# Patient Record
Sex: FEMALE | Race: WHITE | NOT HISPANIC OR LATINO | Employment: UNEMPLOYED | URBAN - METROPOLITAN AREA
[De-identification: names, ages, dates, MRNs, and addresses within clinical notes are randomized per-mention and may not be internally consistent; named-entity substitution may affect disease eponyms.]

---
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
## 2018-01-03 ENCOUNTER — ROUTINE PRENATAL (OUTPATIENT)
Dept: OBSTETRICS AND GYNECOLOGY | Facility: CLINIC | Age: 18
End: 2018-01-03

## 2018-01-03 ENCOUNTER — PROCEDURE VISIT (OUTPATIENT)
Dept: OBSTETRICS AND GYNECOLOGY | Facility: CLINIC | Age: 18
End: 2018-01-03

## 2018-01-03 VITALS — BODY MASS INDEX: 33.56 KG/M2 | DIASTOLIC BLOOD PRESSURE: 58 MMHG | SYSTOLIC BLOOD PRESSURE: 121 MMHG | WEIGHT: 177.6 LBS

## 2018-01-03 DIAGNOSIS — K83.1 CHOLESTASIS DURING PREGNANCY IN THIRD TRIMESTER: Primary | ICD-10-CM

## 2018-01-03 DIAGNOSIS — O26.613 CHOLESTASIS DURING PREGNANCY IN THIRD TRIMESTER: Primary | ICD-10-CM

## 2018-01-03 DIAGNOSIS — Z3A.33 33 WEEKS GESTATION OF PREGNANCY: ICD-10-CM

## 2018-01-03 PROCEDURE — 76819 FETAL BIOPHYS PROFIL W/O NST: CPT | Performed by: OBSTETRICS & GYNECOLOGY

## 2018-01-03 PROCEDURE — 0502F SUBSEQUENT PRENATAL CARE: CPT | Performed by: OBSTETRICS & GYNECOLOGY

## 2018-01-03 PROCEDURE — 76816 OB US FOLLOW-UP PER FETUS: CPT | Performed by: OBSTETRICS & GYNECOLOGY

## 2018-01-03 NOTE — PROGRESS NOTES
CC:  Pregnancy  Patient reports occasional episodes of feeling dizzy.  Had one episode over the weekend while in a car as a passenger when she felt dizzy and then passed out.  She reports blurry vision and ringing in her ears prior to episode.  Explained to patient the symptoms seem related to vasovagal syncope.  Recommend hydration &/or compression stockings.  She has no associated chest pain or shortness of breath.  She reports good fetal movement.  Growth u/s today is reassuring and BPP 8/8.  Discussed steroid course prior to induction and GBS swab next week.  She reports pruritis is currently under control with ursodiol.  A/P:  Supervision of pregnancy at 33 weeks with intrahepatic cholestasis  --Followup weekly with weekly BPPs  Counseling was given to patient for the following topics: diagnostic results, instructions for management, prognosis and patient and family education . Total time of the encounter was 15 minutes and 10 minutes was spend counseling.

## 2018-01-12 ENCOUNTER — PROCEDURE VISIT (OUTPATIENT)
Dept: OBSTETRICS AND GYNECOLOGY | Facility: CLINIC | Age: 18
End: 2018-01-12

## 2018-01-12 ENCOUNTER — ROUTINE PRENATAL (OUTPATIENT)
Dept: OBSTETRICS AND GYNECOLOGY | Facility: CLINIC | Age: 18
End: 2018-01-12

## 2018-01-12 VITALS — SYSTOLIC BLOOD PRESSURE: 112 MMHG | WEIGHT: 179 LBS | BODY MASS INDEX: 33.82 KG/M2 | DIASTOLIC BLOOD PRESSURE: 66 MMHG

## 2018-01-12 DIAGNOSIS — K83.1 CHOLESTASIS DURING PREGNANCY IN THIRD TRIMESTER: Primary | ICD-10-CM

## 2018-01-12 DIAGNOSIS — O26.613 CHOLESTASIS DURING PREGNANCY IN THIRD TRIMESTER: Primary | ICD-10-CM

## 2018-01-12 DIAGNOSIS — Z3A.34 34 WEEKS GESTATION OF PREGNANCY: ICD-10-CM

## 2018-01-12 PROCEDURE — 0502F SUBSEQUENT PRENATAL CARE: CPT | Performed by: OBSTETRICS & GYNECOLOGY

## 2018-01-12 PROCEDURE — 76819 FETAL BIOPHYS PROFIL W/O NST: CPT | Performed by: OBSTETRICS & GYNECOLOGY

## 2018-01-12 NOTE — PROGRESS NOTES
CC:  Pregnancy  Pt feels well. No issues or concerns.  Patient reports good fetal movement.  Plan on betamethasone course next week for fetal lung maturity and IOL on 1/23 for cholestasis.  BPP 8/8.  GBS done today.  A/P:  Supervision of pregnancy at 34 weeks with cholestasis  --GBS today  --BMZ course next week on Thursday and Friday  --Ob visit and BPP next Friday  --IOL on 1/23

## 2018-01-16 LAB — B-HEM STREP SPEC QL CULT: NEGATIVE

## 2018-01-18 ENCOUNTER — CLINICAL SUPPORT (OUTPATIENT)
Dept: OBSTETRICS AND GYNECOLOGY | Facility: CLINIC | Age: 18
End: 2018-01-18

## 2018-01-18 VITALS
HEIGHT: 62 IN | HEART RATE: 90 BPM | BODY MASS INDEX: 33.6 KG/M2 | WEIGHT: 182.6 LBS | SYSTOLIC BLOOD PRESSURE: 93 MMHG | DIASTOLIC BLOOD PRESSURE: 60 MMHG

## 2018-01-18 DIAGNOSIS — O26.613 CHOLESTASIS DURING PREGNANCY IN THIRD TRIMESTER: ICD-10-CM

## 2018-01-18 DIAGNOSIS — K83.1 CHOLESTASIS DURING PREGNANCY IN THIRD TRIMESTER: ICD-10-CM

## 2018-01-18 DIAGNOSIS — Z3A.35 35 WEEKS GESTATION OF PREGNANCY: Primary | ICD-10-CM

## 2018-01-18 PROCEDURE — 96372 THER/PROPH/DIAG INJ SC/IM: CPT | Performed by: OBSTETRICS & GYNECOLOGY

## 2018-01-18 RX ORDER — BETAMETHASONE SODIUM PHOSPHATE AND BETAMETHASONE ACETATE 3; 3 MG/ML; MG/ML
12 INJECTION, SUSPENSION INTRA-ARTICULAR; INTRALESIONAL; INTRAMUSCULAR; SOFT TISSUE EVERY 24 HOURS
Status: SHIPPED | OUTPATIENT
Start: 2018-01-18 | End: 2018-01-20

## 2018-01-18 RX ADMIN — BETAMETHASONE SODIUM PHOSPHATE AND BETAMETHASONE ACETATE 12 MG: 3; 3 INJECTION, SUSPENSION INTRA-ARTICULAR; INTRALESIONAL; INTRAMUSCULAR; SOFT TISSUE at 14:22

## 2018-01-18 NOTE — PROGRESS NOTES
CC: Pt here for steroid injection     Lot # :[id number]   Exp:02/2019   NDC:0226-5884-64    Pt felt lightheaded after injection.

## 2018-01-19 ENCOUNTER — ROUTINE PRENATAL (OUTPATIENT)
Dept: OBSTETRICS AND GYNECOLOGY | Facility: CLINIC | Age: 18
End: 2018-01-19

## 2018-01-19 ENCOUNTER — PROCEDURE VISIT (OUTPATIENT)
Dept: OBSTETRICS AND GYNECOLOGY | Facility: CLINIC | Age: 18
End: 2018-01-19

## 2018-01-19 VITALS — DIASTOLIC BLOOD PRESSURE: 87 MMHG | BODY MASS INDEX: 34.09 KG/M2 | WEIGHT: 186.4 LBS | SYSTOLIC BLOOD PRESSURE: 129 MMHG

## 2018-01-19 DIAGNOSIS — Z3A.35 35 WEEKS GESTATION OF PREGNANCY: ICD-10-CM

## 2018-01-19 DIAGNOSIS — K83.1 CHOLESTASIS DURING PREGNANCY IN THIRD TRIMESTER: Primary | ICD-10-CM

## 2018-01-19 DIAGNOSIS — O26.613 CHOLESTASIS DURING PREGNANCY IN THIRD TRIMESTER: Primary | ICD-10-CM

## 2018-01-19 PROCEDURE — 76819 FETAL BIOPHYS PROFIL W/O NST: CPT | Performed by: OBSTETRICS & GYNECOLOGY

## 2018-01-19 PROCEDURE — 96372 THER/PROPH/DIAG INJ SC/IM: CPT | Performed by: OBSTETRICS & GYNECOLOGY

## 2018-01-19 PROCEDURE — 0502F SUBSEQUENT PRENATAL CARE: CPT | Performed by: OBSTETRICS & GYNECOLOGY

## 2018-01-19 RX ORDER — BETAMETHASONE SODIUM PHOSPHATE AND BETAMETHASONE ACETATE 3; 3 MG/ML; MG/ML
12 INJECTION, SUSPENSION INTRA-ARTICULAR; INTRALESIONAL; INTRAMUSCULAR; SOFT TISSUE EVERY 24 HOURS
Status: SHIPPED | OUTPATIENT
Start: 2018-01-19 | End: 2018-01-21

## 2018-01-19 RX ADMIN — BETAMETHASONE SODIUM PHOSPHATE AND BETAMETHASONE ACETATE 12 MG: 3; 3 INJECTION, SUSPENSION INTRA-ARTICULAR; INTRALESIONAL; INTRAMUSCULAR; SOFT TISSUE at 16:40

## 2018-01-19 NOTE — PROGRESS NOTES
CC:  Pregnancy  Pt c/o restless leg syndrome.  Advised hydration and potassium rich foods.  She reports good fetal movement.  Receiving 2nd BMZ injection today.  Planning IOL Monday night and discussed process of cervical ripening.  Discussed continuing to monitor fetal kick counts.  Risks and benefits of induction discussed with her and she agrees to proceed.  A/P:  Supervision of pregnancy at 35 weeks with cholestasis  --2nd BMZ injection today  --IOL on Monday night

## 2018-01-22 ENCOUNTER — HOSPITAL ENCOUNTER (INPATIENT)
Facility: HOSPITAL | Age: 18
LOS: 3 days | Discharge: HOME | End: 2018-01-25
Attending: OBSTETRICS & GYNECOLOGY | Admitting: OBSTETRICS & GYNECOLOGY

## 2018-01-22 DIAGNOSIS — O09.899 RUBELLA NON-IMMUNE STATUS, ANTEPARTUM: ICD-10-CM

## 2018-01-22 DIAGNOSIS — Z34.01 ENCOUNTER FOR SUPERVISION OF NORMAL FIRST PREGNANCY IN FIRST TRIMESTER: Primary | ICD-10-CM

## 2018-01-22 DIAGNOSIS — Z28.39 RUBELLA NON-IMMUNE STATUS, ANTEPARTUM: ICD-10-CM

## 2018-01-22 DIAGNOSIS — O26.613 CHOLESTASIS DURING PREGNANCY IN THIRD TRIMESTER: ICD-10-CM

## 2018-01-22 DIAGNOSIS — K83.1 CHOLESTASIS DURING PREGNANCY IN THIRD TRIMESTER: ICD-10-CM

## 2018-01-22 LAB
ABO GROUP BLD: NORMAL
ALBUMIN SERPL-MCNC: 3.5 G/DL (ref 3.5–5.2)
ALBUMIN/GLOB SERPL: 1.1 G/DL
ALP SERPL-CCNC: 97 U/L (ref 39–117)
ALT SERPL W P-5'-P-CCNC: 33 U/L (ref 1–33)
ANION GAP SERPL CALCULATED.3IONS-SCNC: 14.6 MMOL/L
AST SERPL-CCNC: 19 U/L (ref 1–32)
BASOPHILS # BLD AUTO: 0.03 10*3/MM3 (ref 0–0.2)
BASOPHILS NFR BLD AUTO: 0.2 % (ref 0–1.5)
BILIRUB SERPL-MCNC: 0.3 MG/DL (ref 0.1–1.2)
BLD GP AB SCN SERPL QL: POSITIVE
BUN BLD-MCNC: 11 MG/DL (ref 6–20)
BUN/CREAT SERPL: 22 (ref 7–25)
CALCIUM SPEC-SCNC: 8.6 MG/DL (ref 8.6–10.5)
CHLORIDE SERPL-SCNC: 102 MMOL/L (ref 98–107)
CO2 SERPL-SCNC: 20.4 MMOL/L (ref 22–29)
CREAT BLD-MCNC: 0.5 MG/DL (ref 0.57–1)
DEPRECATED RDW RBC AUTO: 40.6 FL (ref 37–54)
EOSINOPHIL # BLD AUTO: 0.05 10*3/MM3 (ref 0–0.7)
EOSINOPHIL NFR BLD AUTO: 0.3 % (ref 0.3–6.2)
ERYTHROCYTE [DISTWIDTH] IN BLOOD BY AUTOMATED COUNT: 13.6 % (ref 11.7–13)
GFR SERPL CREATININE-BSD FRML MDRD: >150 ML/MIN/1.73
GLOBULIN UR ELPH-MCNC: 3.1 GM/DL
GLUCOSE BLD-MCNC: 112 MG/DL (ref 65–99)
HCT VFR BLD AUTO: 28.5 % (ref 35.6–45.5)
HGB BLD-MCNC: 9 G/DL (ref 11.9–15.5)
IMM GRANULOCYTES # BLD: 0.3 10*3/MM3 (ref 0–0.03)
IMM GRANULOCYTES NFR BLD: 1.9 % (ref 0–0.5)
LYMPHOCYTES # BLD AUTO: 2.09 10*3/MM3 (ref 0.9–4.8)
LYMPHOCYTES NFR BLD AUTO: 13.1 % (ref 19.6–45.3)
MCH RBC QN AUTO: 25.8 PG (ref 26.9–32)
MCHC RBC AUTO-ENTMCNC: 31.6 G/DL (ref 32.4–36.3)
MCV RBC AUTO: 81.7 FL (ref 80.5–98.2)
MONOCYTES # BLD AUTO: 0.83 10*3/MM3 (ref 0.2–1.2)
MONOCYTES NFR BLD AUTO: 5.2 % (ref 5–12)
NEUTROPHILS # BLD AUTO: 12.67 10*3/MM3 (ref 1.9–8.1)
NEUTROPHILS NFR BLD AUTO: 79.3 % (ref 42.7–76)
PLATELET # BLD AUTO: 249 10*3/MM3 (ref 140–500)
PMV BLD AUTO: 11.5 FL (ref 6–12)
POTASSIUM BLD-SCNC: 3.5 MMOL/L (ref 3.5–5.2)
PROT SERPL-MCNC: 6.6 G/DL (ref 6–8.5)
RBC # BLD AUTO: 3.49 10*6/MM3 (ref 3.9–5.2)
RESIDUAL RHIG DETECTED: NORMAL
RH BLD: NEGATIVE
SODIUM BLD-SCNC: 137 MMOL/L (ref 136–145)
WBC NRBC COR # BLD: 15.97 10*3/MM3 (ref 4.5–10.7)

## 2018-01-22 PROCEDURE — 10907ZC DRAINAGE OF AMNIOTIC FLUID, THERAPEUTIC FROM PRODUCTS OF CONCEPTION, VIA NATURAL OR ARTIFICIAL OPENING: ICD-10-PCS | Performed by: OBSTETRICS & GYNECOLOGY

## 2018-01-22 PROCEDURE — 80053 COMPREHEN METABOLIC PANEL: CPT | Performed by: OBSTETRICS & GYNECOLOGY

## 2018-01-22 PROCEDURE — 86850 RBC ANTIBODY SCREEN: CPT | Performed by: OBSTETRICS & GYNECOLOGY

## 2018-01-22 PROCEDURE — 86870 RBC ANTIBODY IDENTIFICATION: CPT | Performed by: OBSTETRICS & GYNECOLOGY

## 2018-01-22 PROCEDURE — 86901 BLOOD TYPING SEROLOGIC RH(D): CPT | Performed by: OBSTETRICS & GYNECOLOGY

## 2018-01-22 PROCEDURE — 85025 COMPLETE CBC W/AUTO DIFF WBC: CPT | Performed by: OBSTETRICS & GYNECOLOGY

## 2018-01-22 PROCEDURE — 86900 BLOOD TYPING SEROLOGIC ABO: CPT | Performed by: OBSTETRICS & GYNECOLOGY

## 2018-01-22 PROCEDURE — 3E033VJ INTRODUCTION OF OTHER HORMONE INTO PERIPHERAL VEIN, PERCUTANEOUS APPROACH: ICD-10-PCS | Performed by: OBSTETRICS & GYNECOLOGY

## 2018-01-22 RX ORDER — ONDANSETRON 4 MG/1
4 TABLET, FILM COATED ORAL EVERY 6 HOURS PRN
Status: DISCONTINUED | OUTPATIENT
Start: 2018-01-22 | End: 2018-01-23

## 2018-01-22 RX ORDER — SODIUM CHLORIDE 0.9 % (FLUSH) 0.9 %
1-10 SYRINGE (ML) INJECTION AS NEEDED
Status: DISCONTINUED | OUTPATIENT
Start: 2018-01-22 | End: 2018-01-23

## 2018-01-22 RX ORDER — ONDANSETRON 2 MG/ML
4 INJECTION INTRAMUSCULAR; INTRAVENOUS EVERY 6 HOURS PRN
Status: DISCONTINUED | OUTPATIENT
Start: 2018-01-22 | End: 2018-01-23

## 2018-01-22 RX ORDER — ONDANSETRON 4 MG/1
4 TABLET, ORALLY DISINTEGRATING ORAL EVERY 6 HOURS PRN
Status: DISCONTINUED | OUTPATIENT
Start: 2018-01-22 | End: 2018-01-23

## 2018-01-22 RX ORDER — ACETAMINOPHEN 325 MG/1
650 TABLET ORAL EVERY 4 HOURS PRN
Status: DISCONTINUED | OUTPATIENT
Start: 2018-01-22 | End: 2018-01-23

## 2018-01-22 RX ORDER — MISOPROSTOL 100 MCG
25 TABLET ORAL
Status: DISCONTINUED | OUTPATIENT
Start: 2018-01-22 | End: 2018-01-23

## 2018-01-22 RX ORDER — SODIUM CHLORIDE, SODIUM LACTATE, POTASSIUM CHLORIDE, CALCIUM CHLORIDE 600; 310; 30; 20 MG/100ML; MG/100ML; MG/100ML; MG/100ML
125 INJECTION, SOLUTION INTRAVENOUS CONTINUOUS
Status: DISCONTINUED | OUTPATIENT
Start: 2018-01-22 | End: 2018-01-23

## 2018-01-22 RX ORDER — METHYLERGONOVINE MALEATE 0.2 MG/ML
200 INJECTION INTRAVENOUS ONCE AS NEEDED
Status: DISCONTINUED | OUTPATIENT
Start: 2018-01-22 | End: 2018-01-23

## 2018-01-22 RX ORDER — LIDOCAINE HYDROCHLORIDE 10 MG/ML
5 INJECTION, SOLUTION INFILTRATION; PERINEURAL AS NEEDED
Status: DISCONTINUED | OUTPATIENT
Start: 2018-01-22 | End: 2018-01-23

## 2018-01-22 RX ORDER — MISOPROSTOL 200 UG/1
800 TABLET ORAL AS NEEDED
Status: DISCONTINUED | OUTPATIENT
Start: 2018-01-22 | End: 2018-01-23

## 2018-01-22 RX ORDER — CARBOPROST TROMETHAMINE 250 UG/ML
250 INJECTION, SOLUTION INTRAMUSCULAR AS NEEDED
Status: DISCONTINUED | OUTPATIENT
Start: 2018-01-22 | End: 2018-01-23

## 2018-01-22 RX ADMIN — SODIUM CHLORIDE, POTASSIUM CHLORIDE, SODIUM LACTATE AND CALCIUM CHLORIDE 125 ML/HR: 600; 310; 30; 20 INJECTION, SOLUTION INTRAVENOUS at 20:34

## 2018-01-22 RX ADMIN — MISOPROSTOL 25 MCG: 100 TABLET ORAL at 21:13

## 2018-01-23 ENCOUNTER — ANESTHESIA EVENT (OUTPATIENT)
Dept: LABOR AND DELIVERY | Facility: HOSPITAL | Age: 18
End: 2018-01-23

## 2018-01-23 ENCOUNTER — ANESTHESIA (OUTPATIENT)
Dept: LABOR AND DELIVERY | Facility: HOSPITAL | Age: 18
End: 2018-01-23

## 2018-01-23 LAB
ABO GROUP BLD: NORMAL
EXPIRATION DATE: NORMAL
Lab: NORMAL
PROT UR STRIP-MCNC: NEGATIVE MG/DL
RH BLD: NEGATIVE

## 2018-01-23 PROCEDURE — 85461 HEMOGLOBIN FETAL: CPT | Performed by: OBSTETRICS & GYNECOLOGY

## 2018-01-23 PROCEDURE — 86900 BLOOD TYPING SEROLOGIC ABO: CPT | Performed by: OBSTETRICS & GYNECOLOGY

## 2018-01-23 PROCEDURE — 86901 BLOOD TYPING SEROLOGIC RH(D): CPT | Performed by: OBSTETRICS & GYNECOLOGY

## 2018-01-23 PROCEDURE — 0HQ9XZZ REPAIR PERINEUM SKIN, EXTERNAL APPROACH: ICD-10-PCS | Performed by: OBSTETRICS & GYNECOLOGY

## 2018-01-23 PROCEDURE — 25010000002 BUTORPHANOL PER 1 MG: Performed by: OBSTETRICS & GYNECOLOGY

## 2018-01-23 PROCEDURE — 81002 URINALYSIS NONAUTO W/O SCOPE: CPT | Performed by: OBSTETRICS & GYNECOLOGY

## 2018-01-23 PROCEDURE — 59409 OBSTETRICAL CARE: CPT | Performed by: OBSTETRICS & GYNECOLOGY

## 2018-01-23 PROCEDURE — C1755 CATHETER, INTRASPINAL: HCPCS | Performed by: ANESTHESIOLOGY

## 2018-01-23 PROCEDURE — 88307 TISSUE EXAM BY PATHOLOGIST: CPT

## 2018-01-23 PROCEDURE — 10H07YZ INSERTION OF OTHER DEVICE INTO PRODUCTS OF CONCEPTION, VIA NATURAL OR ARTIFICIAL OPENING: ICD-10-PCS | Performed by: OBSTETRICS & GYNECOLOGY

## 2018-01-23 RX ORDER — BUTORPHANOL TARTRATE 1 MG/ML
1 INJECTION, SOLUTION INTRAMUSCULAR; INTRAVENOUS ONCE
Status: COMPLETED | OUTPATIENT
Start: 2018-01-23 | End: 2018-01-23

## 2018-01-23 RX ORDER — PROMETHAZINE HYDROCHLORIDE 25 MG/1
12.5 SUPPOSITORY RECTAL EVERY 6 HOURS PRN
Status: DISCONTINUED | OUTPATIENT
Start: 2018-01-23 | End: 2018-01-25 | Stop reason: HOSPADM

## 2018-01-23 RX ORDER — ONDANSETRON 2 MG/ML
4 INJECTION INTRAMUSCULAR; INTRAVENOUS EVERY 6 HOURS PRN
Status: DISCONTINUED | OUTPATIENT
Start: 2018-01-23 | End: 2018-01-23

## 2018-01-23 RX ORDER — ONDANSETRON 2 MG/ML
4 INJECTION INTRAMUSCULAR; INTRAVENOUS EVERY 6 HOURS PRN
Status: DISCONTINUED | OUTPATIENT
Start: 2018-01-23 | End: 2018-01-25 | Stop reason: HOSPADM

## 2018-01-23 RX ORDER — BISACODYL 10 MG
10 SUPPOSITORY, RECTAL RECTAL DAILY PRN
Status: DISCONTINUED | OUTPATIENT
Start: 2018-01-24 | End: 2018-01-25 | Stop reason: HOSPADM

## 2018-01-23 RX ORDER — ONDANSETRON 4 MG/1
4 TABLET, ORALLY DISINTEGRATING ORAL EVERY 6 HOURS PRN
Status: DISCONTINUED | OUTPATIENT
Start: 2018-01-23 | End: 2018-01-25 | Stop reason: HOSPADM

## 2018-01-23 RX ORDER — ACETAMINOPHEN 325 MG/1
650 TABLET ORAL EVERY 4 HOURS PRN
Status: DISCONTINUED | OUTPATIENT
Start: 2018-01-23 | End: 2018-01-25 | Stop reason: HOSPADM

## 2018-01-23 RX ORDER — SODIUM CHLORIDE 9 MG/ML
100 INJECTION, SOLUTION INTRAVENOUS CONTINUOUS
Status: DISCONTINUED | OUTPATIENT
Start: 2018-01-23 | End: 2018-01-23

## 2018-01-23 RX ORDER — SODIUM CHLORIDE 0.9 % (FLUSH) 0.9 %
1-10 SYRINGE (ML) INJECTION AS NEEDED
Status: DISCONTINUED | OUTPATIENT
Start: 2018-01-23 | End: 2018-01-25 | Stop reason: HOSPADM

## 2018-01-23 RX ORDER — PROMETHAZINE HYDROCHLORIDE 25 MG/1
25 TABLET ORAL EVERY 6 HOURS PRN
Status: DISCONTINUED | OUTPATIENT
Start: 2018-01-23 | End: 2018-01-25 | Stop reason: HOSPADM

## 2018-01-23 RX ORDER — LANOLIN 100 %
OINTMENT (GRAM) TOPICAL
Status: DISCONTINUED | OUTPATIENT
Start: 2018-01-23 | End: 2018-01-25 | Stop reason: HOSPADM

## 2018-01-23 RX ORDER — ERYTHROMYCIN 5 MG/G
OINTMENT OPHTHALMIC
Status: DISPENSED
Start: 2018-01-23 | End: 2018-01-24

## 2018-01-23 RX ORDER — ZOLPIDEM TARTRATE 5 MG/1
5 TABLET ORAL NIGHTLY PRN
Status: DISCONTINUED | OUTPATIENT
Start: 2018-01-23 | End: 2018-01-25 | Stop reason: HOSPADM

## 2018-01-23 RX ORDER — ONDANSETRON 4 MG/1
4 TABLET, FILM COATED ORAL EVERY 6 HOURS PRN
Status: DISCONTINUED | OUTPATIENT
Start: 2018-01-23 | End: 2018-01-25 | Stop reason: HOSPADM

## 2018-01-23 RX ORDER — PROMETHAZINE HYDROCHLORIDE 25 MG/ML
12.5 INJECTION, SOLUTION INTRAMUSCULAR; INTRAVENOUS EVERY 6 HOURS PRN
Status: DISCONTINUED | OUTPATIENT
Start: 2018-01-23 | End: 2018-01-25 | Stop reason: HOSPADM

## 2018-01-23 RX ORDER — OXYTOCIN 10 [USP'U]/ML
2-20 INJECTION, SOLUTION INTRAMUSCULAR; INTRAVENOUS
Status: DISCONTINUED | OUTPATIENT
Start: 2018-01-23 | End: 2018-01-23

## 2018-01-23 RX ORDER — PHYTONADIONE 1 MG/.5ML
INJECTION, EMULSION INTRAMUSCULAR; INTRAVENOUS; SUBCUTANEOUS
Status: DISPENSED
Start: 2018-01-23 | End: 2018-01-24

## 2018-01-23 RX ORDER — IBUPROFEN 600 MG/1
600 TABLET ORAL EVERY 8 HOURS PRN
Status: DISCONTINUED | OUTPATIENT
Start: 2018-01-23 | End: 2018-01-25 | Stop reason: HOSPADM

## 2018-01-23 RX ORDER — EPHEDRINE SULFATE 50 MG/ML
5 INJECTION, SOLUTION INTRAVENOUS
Status: DISCONTINUED | OUTPATIENT
Start: 2018-01-23 | End: 2018-01-23

## 2018-01-23 RX ORDER — OXYTOCIN-SODIUM CHLORIDE 0.9% IV SOLN 30 UNIT/500ML 30-0.9/5 UT/ML-%
2-20 SOLUTION INTRAVENOUS
Status: DISCONTINUED | OUTPATIENT
Start: 2018-01-23 | End: 2018-01-23

## 2018-01-23 RX ORDER — OXYTOCIN-SODIUM CHLORIDE 0.9% IV SOLN 30 UNIT/500ML 30-0.9/5 UT/ML-%
999 SOLUTION INTRAVENOUS ONCE
Status: COMPLETED | OUTPATIENT
Start: 2018-01-23 | End: 2018-01-23

## 2018-01-23 RX ORDER — FAMOTIDINE 10 MG/ML
20 INJECTION, SOLUTION INTRAVENOUS ONCE AS NEEDED
Status: DISCONTINUED | OUTPATIENT
Start: 2018-01-23 | End: 2018-01-23

## 2018-01-23 RX ORDER — ONDANSETRON 2 MG/ML
4 INJECTION INTRAMUSCULAR; INTRAVENOUS ONCE AS NEEDED
Status: DISCONTINUED | OUTPATIENT
Start: 2018-01-23 | End: 2018-01-23

## 2018-01-23 RX ORDER — HYDROCODONE BITARTRATE AND ACETAMINOPHEN 5; 325 MG/1; MG/1
1 TABLET ORAL EVERY 4 HOURS PRN
Status: DISCONTINUED | OUTPATIENT
Start: 2018-01-23 | End: 2018-01-25 | Stop reason: HOSPADM

## 2018-01-23 RX ORDER — DIPHENHYDRAMINE HYDROCHLORIDE 50 MG/ML
12.5 INJECTION INTRAMUSCULAR; INTRAVENOUS EVERY 8 HOURS PRN
Status: DISCONTINUED | OUTPATIENT
Start: 2018-01-23 | End: 2018-01-23

## 2018-01-23 RX ORDER — OXYTOCIN-SODIUM CHLORIDE 0.9% IV SOLN 30 UNIT/500ML 30-0.9/5 UT/ML-%
125 SOLUTION INTRAVENOUS CONTINUOUS PRN
Status: DISCONTINUED | OUTPATIENT
Start: 2018-01-23 | End: 2018-01-23 | Stop reason: HOSPADM

## 2018-01-23 RX ORDER — LIDOCAINE HYDROCHLORIDE AND EPINEPHRINE 15; 5 MG/ML; UG/ML
INJECTION, SOLUTION EPIDURAL AS NEEDED
Status: DISCONTINUED | OUTPATIENT
Start: 2018-01-23 | End: 2018-01-23 | Stop reason: SURG

## 2018-01-23 RX ORDER — DOCUSATE SODIUM 100 MG/1
100 CAPSULE, LIQUID FILLED ORAL 2 TIMES DAILY PRN
Status: DISCONTINUED | OUTPATIENT
Start: 2018-01-23 | End: 2018-01-25 | Stop reason: HOSPADM

## 2018-01-23 RX ORDER — PRENATAL VIT NO.126/IRON/FOLIC 28MG-0.8MG
1 TABLET ORAL DAILY
Status: DISCONTINUED | OUTPATIENT
Start: 2018-01-24 | End: 2018-01-25 | Stop reason: HOSPADM

## 2018-01-23 RX ADMIN — OXYTOCIN 2 MILLI-UNITS/MIN: 10 INJECTION, SOLUTION INTRAMUSCULAR; INTRAVENOUS at 16:30

## 2018-01-23 RX ADMIN — HYDROCODONE BITARTRATE AND ACETAMINOPHEN 1 TABLET: 5; 325 TABLET ORAL at 21:45

## 2018-01-23 RX ADMIN — Medication 10 ML/HR: at 08:47

## 2018-01-23 RX ADMIN — SODIUM CHLORIDE, POTASSIUM CHLORIDE, SODIUM LACTATE AND CALCIUM CHLORIDE 125 ML/HR: 600; 310; 30; 20 INJECTION, SOLUTION INTRAVENOUS at 09:14

## 2018-01-23 RX ADMIN — IBUPROFEN 600 MG: 600 TABLET ORAL at 21:45

## 2018-01-23 RX ADMIN — MISOPROSTOL 25 MCG: 100 TABLET ORAL at 01:22

## 2018-01-23 RX ADMIN — BUTORPHANOL TARTRATE 1 MG: 1 INJECTION, SOLUTION INTRAMUSCULAR; INTRAVENOUS at 05:55

## 2018-01-23 RX ADMIN — SODIUM CHLORIDE: 9 INJECTION, SOLUTION INTRAVENOUS at 15:56

## 2018-01-23 RX ADMIN — Medication: at 21:45

## 2018-01-23 RX ADMIN — OXYTOCIN 999 ML/HR: 10 INJECTION, SOLUTION INTRAMUSCULAR; INTRAVENOUS at 18:32

## 2018-01-23 RX ADMIN — OXYTOCIN 2 MILLI-UNITS/MIN: 10 INJECTION, SOLUTION INTRAMUSCULAR; INTRAVENOUS at 10:19

## 2018-01-23 RX ADMIN — SODIUM CHLORIDE, POTASSIUM CHLORIDE, SODIUM LACTATE AND CALCIUM CHLORIDE 125 ML/HR: 600; 310; 30; 20 INJECTION, SOLUTION INTRAVENOUS at 08:05

## 2018-01-23 RX ADMIN — EPHEDRINE SULFATE 5 MG: 50 INJECTION INTRAMUSCULAR; INTRAVENOUS; SUBCUTANEOUS at 10:38

## 2018-01-23 RX ADMIN — BENZOCAINE AND MENTHOL: 20; .5 SPRAY TOPICAL at 21:45

## 2018-01-23 RX ADMIN — SODIUM CHLORIDE, POTASSIUM CHLORIDE, SODIUM LACTATE AND CALCIUM CHLORIDE 125 ML/HR: 600; 310; 30; 20 INJECTION, SOLUTION INTRAVENOUS at 03:53

## 2018-01-23 RX ADMIN — LIDOCAINE HYDROCHLORIDE AND EPINEPHRINE 2 ML: 15; 5 INJECTION, SOLUTION EPIDURAL at 08:44

## 2018-01-23 RX ADMIN — LIDOCAINE HYDROCHLORIDE AND EPINEPHRINE 3 ML: 15; 5 INJECTION, SOLUTION EPIDURAL at 08:41

## 2018-01-23 NOTE — H&P
[facility]  Obstetric History and Physical    Chief Complaint   Patient presents with   • Scheduled Induction     Cholestsis       Subjective     Patient is a 17 y.o. female  currently at 36w3d, who presents for induction of labor secondary to cholestasis.  Patient began having intense pruritis at 28 weeks and labwork was remarkable for elevated LFTs and total bile acids >10.  She was started on ursodiol.  Per uptodate recommendations, patient was scheduled for induction between 36-37 weeks.  She received BMZ course last week.    The following portions of the patients history were reviewed and updated as appropriate: current medications, allergies, past medical history, past surgical history, past family history, past social history and problem list .       Prenatal Information:  Prenatal Results         Initial Prenatal Labs Ref. Range Date Time   Hemoglobin  13.1 g/dL 11.1 - 15.9 g/dL 17 1516   Hematocrit  38.6 % 34.0 - 46.6 % 17 151   Platelets  249 10*3/mm3 140 - 500 10*3/mm3 18   Rubella IgG  <0.90 index (L) Immune >0.99 index 17 1516   Hepatitis B SAg  Negative  Negative 17 1516   Hepatitis C Ab  <0.1 s/co ratio 0.0 - 0.9 s/co ratio 17 1516   RPR  Non Reactive  Non Reactive 17 1516   ABO  AB   18   Rh  Negative   18   Antibody Screen  Negative  Negative 17 1516   HIV  Non Reactive  Non Reactive 17 1516   Urine Culture  Final report   17 0949   Gonorrhea  Negative  Negative 17 0952   Chlamydia  Negative  Negative 17 0952   TSH       2nd and 3rd Trimester Ref. Range Date Time   Hemoglobin (repeated)  9.0 g/dL (L) 11.9 - 15.5 g/dL 18   Hematocrit (repeated)  28.5 % (L) 35.6 - 45.5 % 18   GCT  117 mg/dL 65 - 139 mg/dL 10/31/17 1428   Antibody Screen (repeated)  Positive   18   GTT Fasting       GTT 1 Hr       GTT 2 Hr       GTT 3 Hr       Group B Strep  Negative  Negative  18 1356   Drug Screening Ref. Range Date Time   Amphetamine Screen       Barbiturate Screen       Benzodiazepine Screen       Methadone Screen       Phencyclidine Screen       Opiates Screen       THC Screen  Negative ng/mL Cutoff:5 ng/mL 17 1516   Cocaine Screen       Propoxyphene Screen       Buprenorphine Screen       Methamphetamine Screen       Oxycodone Screen       Tryicyclic Antidepressants Screen       Other (Risk screening) Ref. Range Date Time   Varicella IgG       Parvovirus IgG       CMV IgG       Cystic Fibrosis       Hemoglobin electrophoresis       NIPT       MSAFP-4       AFP (for NTD only)              Legend: ^: Historical            View all results for this pregnancy             Past OB History:     Obstetric History       T0      L0     SAB0   TAB0   Ectopic0   Multiple0   Live Births0       # Outcome Date GA Lbr Jackson/2nd Weight Sex Delivery Anes PTL Lv   1 Current                   Past Medical History: Past Medical History:   Diagnosis Date   • Urinary tract infection       Past Surgical History History reviewed. No pertinent surgical history.   Family History: Family History   Problem Relation Age of Onset   • Breast cancer Paternal Grandmother    • Breast cancer Maternal Grandmother    • Hypertension Maternal Grandmother    • Diabetes Maternal Grandfather       Social History:  reports that she has been smoking Electronic Cigarette.  She has been smoking about 0.25 packs per day. She has never used smokeless tobacco.   reports that she does not drink alcohol.   reports that she does not use illicit drugs.        General ROS: Pertinent items are noted in HPI, all other systems reviewed and negative    Objective       Vital Signs Range for the last 24 hours  Temperature: Temp:  [98.4 °F (36.9 °C)-98.8 °F (37.1 °C)] 98.4 °F (36.9 °C)   Temp Source: Temp src: Oral   BP: BP: ()/(54-78) 110/64   Pulse: Heart Rate:  [] 77   Respirations: Resp:  [16-18] 18    SPO2: SpO2:  [97 %-99 %] 99 %   O2 Amount (l/min):     O2 Devices     Weight: Weight:  [81.1 kg (178 lb 12.8 oz)-85.2 kg (187 lb 12.8 oz)] 85.2 kg (187 lb 12.8 oz)     Physical Examination: General appearance - alert, well appearing, and in no distress  Chest - clear to auscultation, no wheezes, rales or rhonchi, symmetric air entry  Heart - normal rate and regular rhythm  Abdomen - gravid, soft, nontender, EFW 5.5#  Extremities - pedal edema 1 +    Presentation: vertex   Cervix: Exam by: Method: sterile exam per RN   Dilation: Dilation: 1   Effacement: Cervical Effacement: 70%   Station: Station: -2       Fetal Heart Rate Assessment   Method: Fetal HR Assessment Method: external   Beats/min: Fetal HR (Beats/Min): 140   Baseline: Fetal HR Baseline: normal range (110-160 bpm)   Varibility: Fetal HR Variability: moderate (amplitude range 6 to 25 bpm)   Accels: Fetal HR Accelerations: greater than/equal to 15 bpm, lasting at least 15 seconds   Decels: Fetal HR Decelerations: absent   Tracing Category: Fetal HR Tracing Category: Category I     Uterine Assessment   Method: Method: TOCO (external toco transducer), palpation   Frequency (min): Contraction Frequency (min): 1-3   Ctx Count in 10 min: Contraction Frequency in 10min: 5   Duration: Contraction Duration (sec): 40-60   Intensity: Contraction Intensity: mild by palpation   Intensity by IUPC:     Resting Tone: Uterine Resting Tone: relaxation >60 sec, soft by palpation   Resting Tone by IUPC:     Bedford Units:       Assisted rupture of membranes for clear fluid. SVE 1.5/70%/-2    Assessment/Plan     Active Problems:    Cholestasis during pregnancy in third trimester        Assessment:  1.  Intrauterine pregnancy at 36w3d weeks gestation with reassuring fetal status.    2.  induction of labor  for cholestasis  with unfavorable cervix  3.  Obstetrical history significant for cholestasis.  4.  GBS status: Negative    Plan:  1. Patient received two doses of  cytotec overnight and has received IV stadol.  She is requesting epidural.  Patient states she is feeling contractions back to back.  AROM performed and attempt made to place IUPC to better monitor contractions but patient unable to tolerate placement of IUPC.  Will try again after epidural.  Will start pitocin if needed.  2. Plan of care has been reviewed with patient and significant other.  3.  Risks, benefits of treatment plan have been discussed.  4.  All questions have been answered.      Annabel Villarreal MD  1/23/2018  8:28 AM

## 2018-01-23 NOTE — L&D DELIVERY NOTE
[facility]  Vaginal Delivery Note    Delivery     Delivery: Vaginal, Spontaneous Delivery     YOB: 2020    Time of Birth: 6:28 PM      Anesthesia: Epidural    Delivering clinician: Annabel Villarreal MD   Forceps?   No   Vacuum? No    Shoulder dystocia present: No        Delivery narrative:  Patient is a 17-year-old  that was admitted at 36 weeks and 2 days for induction of labor for cholestasis in pregnancy.  Patient had been diagnosed at 28 weeks and had been on ursodiol since that time and had been undergoing weekly  testing.  On admission patient's cervix was 1 cm and unfavorable.  She received 2 doses of Cytotec and became uncomfortable and received Stadol.  Patient was mackenzie too often for another dose of Cytotec and contractions were not being well monitored.  At that point she was 1-2 cm and had assisted rupture membranes for clear fluid and an IUPC was placed.  Patient received an epidural.  She did require Pitocin for inadequate contractions.  She then progressed along a normal primiparous labor curve to complete dilation and +1 station and was feeling a strong urge to push.    Patient delivered a liveborn female infant in the occiput anterior position over an intact perineum.  With maternal pushing and posterior guidance of the fetal head, the anterior shoulder was easily delivered followed by the remainder of the body.  The infant was immediately vigorous and placed on mom's chest.  Delayed cord clamping was performed for 30 seconds and the cord was clamped and cut by the father.  Cord blood was collected and sent.  The placenta then delivered spontaneously and was intact with a three-vessel cord.  Inspection of her perineum revealed a left periurethral laceration that was repaired with 3-0 Vicryl in a running fashion.  Bimanual exam revealed a firm uterus with no remaining products of conception.  Bleeding was minimal at the end of the procedure and perineum was  hemostatic.  Patient tolerated the procedure well.  All counts were correct at the end of the procedure.    Infant    Findings: female  infant     Infant observations: Weight: No birth weight on file.   Length:    in  Observations/Comments:         Apgars: pending       Infant Name:      Placenta, Cord, and Fluid    Placenta delivered  Spontaneous   Cord: 3 vessels  present.   Nuchal Cord?  no   Cord blood obtained: Yes   Cord gases obtained:  No   Cord gas results: Venous:  No results found for: PHCVEN    Arterial:  No results found for: PHCART     Repair    Episiotomy: Not recorded    Lacerations: Yes  Laceration Information  Laceration Repaired?   Perineal:         Periurethral:   Left   Yes   Labial:         Sulcus:         Vaginal:         Cervical:            Estimated Blood Loss:  200 mL     Suture used for repair: 3-0 Vicryl      Complications  none    Disposition  Mother to Mother Baby/Postpartum  in stable condition currently.  Baby to remains with mom  in stable condition currently.      Annabel Villarreal MD  01/23/18  7:22 PM

## 2018-01-23 NOTE — PLAN OF CARE
Problem: Patient Care Overview (Adult)  Goal: Plan of Care Review  Outcome: Ongoing (interventions implemented as appropriate)   01/23/18 0550   Coping/Psychosocial Response Interventions   Plan Of Care Reviewed With patient;spouse   Patient Care Overview   Progress progress toward functional goals as expected     Goal: Adult Individualization and Mutuality  Outcome: Ongoing (interventions implemented as appropriate)   01/23/18 0550   Individualization   Patient Specific Preferences skin to skin, epidural, breastfeed     Goal: Discharge Needs Assessment  Outcome: Ongoing (interventions implemented as appropriate)   01/22/18 2055 01/23/18 0550   Discharge Needs Assessment   Concerns To Be Addressed --  no discharge needs identified   Readmission Within The Last 30 Days --  no previous admission in last 30 days   Current Health   Anticipated Changes Related to Illness --  none   Self-Care   Equipment Currently Used at Home --  none   Living Environment   Transportation Available car --        Problem: Labor (Cervical Ripen, Induct, Augment) (Adult,Obstetrics,Pediatric)  Intervention: Position/Reposition to Promote Fetal Well Being/Optimize Contraction Pattern   01/23/18 0550   Maternal/Fetal Interventions   Activity In Labor bed rest with bathroom privileges   Positioning   Positioning semi-Fowlers     Intervention: Monitor/Manage Labor Dystocia   01/23/18 0550   Nutrition Interventions   Fluid/Electrolyte Management fluids adjusted   Maternal/Fetal Interventions   Labor Interventions fetal heart rate monitored;intravenous fluid bolus administered;voiding every 2 hours encouraged     Intervention: Assess for/Assist with Variations in Fetal Presentation   01/23/18 0550   Maternal/Fetal Interventions   Labor Interventions fetal heart rate monitored;intravenous fluid bolus administered;voiding every 2 hours encouraged     Intervention: Monitor/Manage Labor Pain   01/23/18 0550   Manage Acute Burn Pain   Pain Management  Interventions medicated     Intervention: Provide Emotional Support and Encouragement   01/23/18 0550   Coping Strategies   Trust Relationship/Rapport care explained;choices provided;questions answered;questions encouraged     Intervention: Monitor/Manage Maternal Hemodynamic Stability   01/23/18 0550   Nutrition Interventions   Fluid/Electrolyte Management fluids adjusted       Goal: Signs and Symptoms of Listed Potential Problems Will be Absent or Manageable (Labor)  Outcome: Ongoing (interventions implemented as appropriate)   01/23/18 0550   Labor (Cervical Ripen, Induct, Augment)   Problems Assessed (Labor) all   Problems Present (Labor) pain

## 2018-01-23 NOTE — PROGRESS NOTES
IUPC placed without difficulty.  SVE 1-2cm/70%/-2.  Will start pitocin if contractions not adequate.  Category 1 FHT.

## 2018-01-23 NOTE — ANESTHESIA PREPROCEDURE EVALUATION
Anesthesia Evaluation     no history of anesthetic complications:         Airway   no difficulty expected  Dental - normal exam     Pulmonary - normal exam   (+) a smoker (cessation advised) Current,   (-) COPD, asthma, sleep apnea    PE comment: nonlabored  Cardiovascular - negative cardio ROS and normal exam    Rhythm: regular  Rate: normal    (-) hypertension, valvular problems/murmurs, past MI, CAD, dysrhythmias, angina      Neuro/Psych- negative ROS  (-) seizures, TIA, CVA  GI/Hepatic/Renal/Endo - negative ROS   (-) GERD, liver disease, diabetes, hypothyroidism, hyperthyroidism    ROS Comment: Cholestasis    Musculoskeletal (-) negative ROS    Abdominal    Substance History      OB/GYN    (+) Pregnant,         Other                                                Anesthesia Plan    ASA 2     epidural     Anesthetic plan and risks discussed with patient.

## 2018-01-23 NOTE — ANESTHESIA PROCEDURE NOTES
Labor Epidural    Patient location during procedure: OB  Indication:at surgeon's request  Performed By  Anesthesiologist: CARLOS DIALLO  Preanesthetic Checklist  Completed: patient identified, site marked, surgical consent, pre-op evaluation, timeout performed, IV checked, risks and benefits discussed and monitors and equipment checked  Additional Notes  Connected epidural catheter to PCEA and detailed instructions given to patient for usage of PCEA pump.  Prep:  Pt Position:sitting  Sterile Tech:cap, gloves, mask and sterile barrier  Prep:chlorhexidine gluconate and isopropyl alcohol  Monitoring:blood pressure monitoring and EKG  Epidural Block Procedure:  Approach:midline  Guidance:landmark technique and palpation technique  Location:L3-L4  Needle Type:Tuohy  Needle Gauge:17 G  Loss of Resistance Medium: saline  Loss of Resistance: 6cm  Cath Depth at skin:12 cm  Paresthesia: none  Aspiration:negative  Test Dose:negative  Number of Attempts: 1  Post Assessment:  Dressing:occlusive dressing applied and secured with tape  Pt Tolerance:patient tolerated the procedure well with no apparent complications  Complications:no

## 2018-01-24 LAB
BASOPHILS # BLD AUTO: 0.02 10*3/MM3 (ref 0–0.2)
BASOPHILS NFR BLD AUTO: 0.1 % (ref 0–1.5)
DEPRECATED RDW RBC AUTO: 41.2 FL (ref 37–54)
EOSINOPHIL # BLD AUTO: 0.05 10*3/MM3 (ref 0–0.7)
EOSINOPHIL NFR BLD AUTO: 0.2 % (ref 0.3–6.2)
ERYTHROCYTE [DISTWIDTH] IN BLOOD BY AUTOMATED COUNT: 13.7 % (ref 11.7–13)
FETAL BLEED: NEGATIVE
HCT VFR BLD AUTO: 26.7 % (ref 35.6–45.5)
HGB BLD-MCNC: 8.5 G/DL (ref 11.9–15.5)
IMM GRANULOCYTES # BLD: 0.17 10*3/MM3 (ref 0–0.03)
IMM GRANULOCYTES NFR BLD: 0.7 % (ref 0–0.5)
LYMPHOCYTES # BLD AUTO: 1.71 10*3/MM3 (ref 0.9–4.8)
LYMPHOCYTES NFR BLD AUTO: 7.2 % (ref 19.6–45.3)
MCH RBC QN AUTO: 26 PG (ref 26.9–32)
MCHC RBC AUTO-ENTMCNC: 31.8 G/DL (ref 32.4–36.3)
MCV RBC AUTO: 81.7 FL (ref 80.5–98.2)
MONOCYTES # BLD AUTO: 1.44 10*3/MM3 (ref 0.2–1.2)
MONOCYTES NFR BLD AUTO: 6.1 % (ref 5–12)
NEUTROPHILS # BLD AUTO: 20.35 10*3/MM3 (ref 1.9–8.1)
NEUTROPHILS NFR BLD AUTO: 85.7 % (ref 42.7–76)
NUMBER OF DOSES: NORMAL
PLATELET # BLD AUTO: 226 10*3/MM3 (ref 140–500)
PMV BLD AUTO: 11.4 FL (ref 6–12)
RBC # BLD AUTO: 3.27 10*6/MM3 (ref 3.9–5.2)
WBC NRBC COR # BLD: 23.74 10*3/MM3 (ref 4.5–10.7)

## 2018-01-24 PROCEDURE — 99232 SBSQ HOSP IP/OBS MODERATE 35: CPT | Performed by: OBSTETRICS & GYNECOLOGY

## 2018-01-24 PROCEDURE — 25010000002 RHO D IMMUNE GLOBULIN 1500 UNIT/2ML SOLUTION PREFILLED SYRINGE: Performed by: OBSTETRICS & GYNECOLOGY

## 2018-01-24 PROCEDURE — 85025 COMPLETE CBC W/AUTO DIFF WBC: CPT | Performed by: OBSTETRICS & GYNECOLOGY

## 2018-01-24 RX ADMIN — HYDROCODONE BITARTRATE AND ACETAMINOPHEN 1 TABLET: 5; 325 TABLET ORAL at 15:22

## 2018-01-24 RX ADMIN — HYDROCODONE BITARTRATE AND ACETAMINOPHEN 1 TABLET: 5; 325 TABLET ORAL at 11:10

## 2018-01-24 RX ADMIN — IBUPROFEN 600 MG: 600 TABLET ORAL at 23:39

## 2018-01-24 RX ADMIN — IBUPROFEN 600 MG: 600 TABLET ORAL at 06:29

## 2018-01-24 RX ADMIN — Medication 1 TABLET: at 08:31

## 2018-01-24 RX ADMIN — DOCUSATE SODIUM 100 MG: 100 CAPSULE, LIQUID FILLED ORAL at 21:29

## 2018-01-24 RX ADMIN — DOCUSATE SODIUM 100 MG: 100 CAPSULE, LIQUID FILLED ORAL at 08:31

## 2018-01-24 RX ADMIN — IBUPROFEN 600 MG: 600 TABLET ORAL at 15:22

## 2018-01-24 RX ADMIN — HYDROCODONE BITARTRATE AND ACETAMINOPHEN 1 TABLET: 5; 325 TABLET ORAL at 21:29

## 2018-01-24 RX ADMIN — HUMAN RHO(D) IMMUNE GLOBULIN 300 MCG: 1500 SOLUTION INTRAMUSCULAR; INTRAVENOUS at 03:02

## 2018-01-24 NOTE — PLAN OF CARE
Problem: Patient Care Overview (Adult)  Goal: Plan of Care Review  Outcome: Ongoing (interventions implemented as appropriate)   01/24/18 0445   Patient Care Overview   Progress progress toward functional goals as expected   Outcome Evaluation   Outcome Summary/Follow up Plan vss, firm at 1 below, bleeding light, pain control with PO meds, pt ambulating in room/to bathroom, self pericare, using icepacks, moderate swelling, breastfeeding q2-3hrs, attentive to infant needs, supportive FOB at bedside     Goal: Adult Individualization and Mutuality  Outcome: Ongoing (interventions implemented as appropriate)    Goal: Discharge Needs Assessment  Outcome: Ongoing (interventions implemented as appropriate)      Problem: Postpartum, Vaginal Delivery (Adult)  Goal: Signs and Symptoms of Listed Potential Problems Will be Absent or Manageable (Postpartum, Vaginal Delivery)  Outcome: Ongoing (interventions implemented as appropriate)      Problem: Fall Risk (Adult)  Goal: Identify Related Risk Factors and Signs and Symptoms  Outcome: Ongoing (interventions implemented as appropriate)    Goal: Absence of Falls  Outcome: Ongoing (interventions implemented as appropriate)   01/24/18 0445   Fall Risk (Adult)   Absence of Falls achieves outcome       Problem: Anesthesia/Analgesia, Neuraxial (Obstetrics)  Goal: Signs and Symptoms of Listed Potential Problems Will be Absent or Manageable (Anesthesia/Analgesia, Neuraxial)  Outcome: Ongoing (interventions implemented as appropriate)

## 2018-01-24 NOTE — PLAN OF CARE
Problem: Patient Care Overview (Adult)  Goal: Plan of Care Review  Outcome: Ongoing (interventions implemented as appropriate)   01/23/18 1938   Coping/Psychosocial Response Interventions   Plan Of Care Reviewed With patient   Patient Care Overview   Progress improving   Outcome Evaluation   Outcome Summary/Follow up Plan Patient delivered vaginally at 1828. Healthy mom and baby. Skin to skin after delivery. Rhogam sent to lab, placenta sent to pathology.     Goal: Adult Individualization and Mutuality  Outcome: Ongoing (interventions implemented as appropriate)    Goal: Discharge Needs Assessment  Outcome: Ongoing (interventions implemented as appropriate)      Problem: Labor (Cervical Ripen, Induct, Augment) (Adult,Obstetrics,Pediatric)  Goal: Signs and Symptoms of Listed Potential Problems Will be Absent or Manageable (Labor)  Outcome: Ongoing (interventions implemented as appropriate)      Problem: Postpartum, Vaginal Delivery (Adult)  Goal: Signs and Symptoms of Listed Potential Problems Will be Absent or Manageable (Postpartum, Vaginal Delivery)  Outcome: Ongoing (interventions implemented as appropriate)      Problem: Fall Risk (Adult)  Goal: Identify Related Risk Factors and Signs and Symptoms  Outcome: Ongoing (interventions implemented as appropriate)    Goal: Absence of Falls  Outcome: Ongoing (interventions implemented as appropriate)      Problem: Anesthesia/Analgesia, Neuraxial (Obstetrics)  Goal: Signs and Symptoms of Listed Potential Problems Will be Absent or Manageable (Anesthesia/Analgesia, Neuraxial)  Outcome: Ongoing (interventions implemented as appropriate)

## 2018-01-24 NOTE — LACTATION NOTE
This note was copied from a baby's chart.  P1 36 weeks 5#12oz baby. Mom reports leaking colostrum and she just nursed well. HGP initiated. Educated on operation and cleaning of pump and also frequency. Declines assistance now stating she will call when ready.

## 2018-01-24 NOTE — ANESTHESIA POSTPROCEDURE EVALUATION
Patient: Christine Berkowitz    Procedure Summary     Date Anesthesia Start Anesthesia Stop Room / Location    01/23/18 0834 1828        Procedure Diagnosis Scheduled Providers Provider    LABOR ANALGESIA No diagnosis on file.  Marck Harry MD          Anesthesia Type: epidural  Last vitals  BP   117/70 (01/23/18 1916)   Temp   36.8 °C (98.2 °F) (01/23/18 1845)   Pulse   94 (01/23/18 1916)   Resp   16 (01/23/18 1910)     SpO2   99 % (01/22/18 2043)     Post Anesthesia Care and Evaluation    Anesthetic complications: No anesthetic complications

## 2018-01-24 NOTE — PROGRESS NOTES
"Subjective:  Postpartum Day 1:     The patient feels well.  Pain is well controlled with current medications. The baby is well.  Urinary output is adequate. The patient is ambulating well. The patient is tolerating a normal diet. Flatus has been passed.      Objective:    Vital signs in last 24 hours:  Blood pressure 101/64, pulse 92, temperature 98.6 °F (37 °C), temperature source Oral, resp. rate 18, height 154.9 cm (61\"), weight 85.2 kg (187 lb 12.8 oz), last menstrual period 05/25/2017, SpO2 99 %, currently breastfeeding.      General:    alert, appears stated age and cooperative   Uterine Fundus:   firm     Labs    Rh + / Female infant    Assessment/Plan:.     Postpartum Day #1  - Discussed importance of moving to prevent DVT.  - Discussed breast feeding, bleeding and pain control.    John Guerra MD     I spent 25 minutes floor time/patient time/chart review and greater than 15 minutes in counseling of above issues.    "

## 2018-01-25 VITALS
OXYGEN SATURATION: 99 % | DIASTOLIC BLOOD PRESSURE: 69 MMHG | RESPIRATION RATE: 18 BRPM | BODY MASS INDEX: 35.45 KG/M2 | SYSTOLIC BLOOD PRESSURE: 108 MMHG | HEART RATE: 82 BPM | TEMPERATURE: 98.4 F | HEIGHT: 61 IN | WEIGHT: 187.8 LBS

## 2018-01-25 PROCEDURE — 99238 HOSP IP/OBS DSCHRG MGMT 30/<: CPT | Performed by: OBSTETRICS & GYNECOLOGY

## 2018-01-25 RX ORDER — FERROUS SULFATE 325(65) MG
325 TABLET ORAL
Qty: 30 TABLET | Refills: 0 | Status: SHIPPED | OUTPATIENT
Start: 2018-01-25 | End: 2018-02-27

## 2018-01-25 RX ORDER — FERROUS SULFATE 325(65) MG
325 TABLET ORAL
Status: DISCONTINUED | OUTPATIENT
Start: 2018-01-25 | End: 2018-01-25 | Stop reason: HOSPADM

## 2018-01-25 RX ORDER — IBUPROFEN 600 MG/1
600 TABLET ORAL EVERY 8 HOURS PRN
Qty: 50 TABLET | Refills: 3 | Status: SHIPPED | OUTPATIENT
Start: 2018-01-25 | End: 2018-02-27

## 2018-01-25 RX ORDER — HYDROCODONE BITARTRATE AND ACETAMINOPHEN 5; 325 MG/1; MG/1
1 TABLET ORAL EVERY 4 HOURS PRN
Qty: 30 TABLET | Refills: 0 | Status: SHIPPED | OUTPATIENT
Start: 2018-01-25 | End: 2018-02-02

## 2018-01-25 RX ADMIN — FERROUS SULFATE TAB 325 MG (65 MG ELEMENTAL FE) 325 MG: 325 (65 FE) TAB at 14:11

## 2018-01-25 RX ADMIN — IBUPROFEN 600 MG: 600 TABLET ORAL at 09:26

## 2018-01-25 RX ADMIN — DOCUSATE SODIUM 100 MG: 100 CAPSULE, LIQUID FILLED ORAL at 09:26

## 2018-01-25 RX ADMIN — Medication 1 TABLET: at 09:26

## 2018-01-25 RX ADMIN — HYDROCODONE BITARTRATE AND ACETAMINOPHEN 1 TABLET: 5; 325 TABLET ORAL at 09:25

## 2018-01-25 RX ADMIN — MEASLES, MUMPS, AND RUBELLA VIRUS VACCINE LIVE 0.5 ML: 1000; 12500; 1000 INJECTION, POWDER, LYOPHILIZED, FOR SUSPENSION SUBCUTANEOUS at 14:06

## 2018-01-25 NOTE — PROGRESS NOTES
Postpartum Progress Note      Status post Vaginal Delivery: Doing well postoperatively.     1) postpartum care immediately following delivery : doing well, discharge home.  2) Rubella non-immune, MMR ordered   3) Anemia, postpartum Iron ordered.     Rh status: AB negative, Rhogam given   Rubella: non-immune, MMR ordered   Gender: Female    Subjective     Postpartum Day 2: Vaginal delivery    The patient feels well. The patient denies emotional concerns. Pain is well controlled with current medications. The baby is well. The patient is ambulating well. The patient is tolerating a normal diet.     Objective     Vital signs in last 24 hours:  Temp:  [98.4 °F (36.9 °C)-98.8 °F (37.1 °C)] 98.4 °F (36.9 °C)  Heart Rate:  [82-90] 82  Resp:  [16-18] 18  BP: (108-110)/(69-72) 108/69      General:    alert, appears stated age and cooperative   Abdomen:  Soft, Non-tender    Lochia:  appropriate   Uterine Fundus:   firm   Ext    Edema 1+   DVT Evaluation:  No evidence of DVT seen on physical exam.     Lab Results   Component Value Date    WBC 23.74 (H) 01/24/2018    HGB 8.5 (L) 01/24/2018    HCT 26.7 (L) 01/24/2018    MCV 81.7 01/24/2018     01/24/2018       Perfecto Bruce MD  1/25/2018  9:57 AM

## 2018-01-25 NOTE — PLAN OF CARE
Problem: Postpartum, Vaginal Delivery (Adult)  Goal: Signs and Symptoms of Listed Potential Problems Will be Absent or Manageable (Postpartum, Vaginal Delivery)  Outcome: Ongoing (interventions implemented as appropriate)   01/24/18 1931   Postpartum, Vaginal Delivery   Problems Assessed (Postpartum Vaginal Delivery) all   Problems Present (Postpartum Vaginal Delivery) none

## 2018-01-25 NOTE — LACTATION NOTE
P1. Mom reports baby has not BF since 2 a.m. She reports she get frustrated at breasts and then goes to sleep. Encouraged mom to pump after she finishes her breakfast and call me for assistance with syringe feeding and latching. Gave [facility] card for f/u

## 2018-01-25 NOTE — PLAN OF CARE
Problem: Patient Care Overview (Adult)  Goal: Plan of Care Review  Outcome: Outcome(s) achieved Date Met: 18 1140   Coping/Psychosocial Response Interventions   Plan Of Care Reviewed With patient   Patient Care Overview   Progress progress toward functional goals as expected     Goal: Adult Individualization and Mutuality  Outcome: Outcome(s) achieved Date Met: 18    Goal: Discharge Needs Assessment   18 1140   Discharge Needs Assessment   Concerns To Be Addressed no discharge needs identified   Readmission Within The Last 30 Days no previous admission in last 30 days   Equipment Needed After Discharge none   Discharge Disposition home or self-care   Current Health   Anticipated Changes Related to Illness none   Living Environment   Transportation Available car       Problem: Postpartum, Vaginal Delivery (Adult)  Goal: Signs and Symptoms of Listed Potential Problems Will be Absent or Manageable (Postpartum, Vaginal Delivery)  Outcome: Outcome(s) achieved Date Met: 18 1140   Postpartum, Vaginal Delivery   Problems Assessed (Postpartum Vaginal Delivery) all   Problems Present (Postpartum Vaginal Delivery) none       Problem: Breastfeeding (Adult,NICU,,Obstetrics,Pediatric)  Goal: Signs and Symptoms of Listed Potential Problems Will be Absent or Manageable (Breastfeeding)  Outcome: Outcome(s) achieved Date Met: 18 1140   Breastfeeding   Problems Assessed (Breastfeeding) all   Problems Present (Breastfeeding) none

## 2018-01-25 NOTE — PAYOR COMM NOTE
"[facility]  [address] [place] [address]    Joaquin Bacon  Utilization Review/Room Reservations  Phone: [phone], [phone], & [phone]  Fax: [phone]  Email: [email or web address]  Please call, fax back, or email with authorization or any questions! Thanks!    This fax contains any of the following:  Face Sheet, H&P, progress notes, consults, orders, meds, lab results, labor record, vitals, delivery worksheet, op note, d/c summary.    Christine King (17 y.o. Female)     Date of Birth Social Security Number Address Home Phone MRN    2000  [id number] [facility] [address] [phone] [id number]    Ep[employer] Marital Status          Unknown Single       Admission Date Admission Type Admitting Provider Attending Provider Department, Room/Bed    1/22/18 Elective Annabel Villarreal MD [place], Annabel Humphries MD [facility] 3 [facility], M351/1    Discharge Date Discharge Disposition Discharge Destination                      Attending Provider: Annabel Villarreal MD     Allergies:  [place] Flavor    Isolation:  None   Infection:  None   Code Status:  FULL    Ht:  154.9 cm (61\")   Wt:  85.2 kg (187 lb 12.8 oz)    Admission Cmt:  None   Principal Problem:  Cholestasis during pregnancy in third trimester [O26.613,K83.1]                 Active Insurance as of 1/22/2018     Primary Coverage     Payor Plan Insurance Group Employer/Plan Group    PAS[employer]ORT PAS[employer]ORT MEDICAID     Payor Plan Address Payor Plan Phone Number Effective From Effective To    [address] [phone] 10/1/2017     [place] [address]       Subscriber Name Subscriber Birth Date Member ID       CHRISTINE KING 2000 [id number]                 Emergency Contacts      (Rel.) Home Phone Work Phone Mobile Phone    Charmaine King (Father) -- -- [phone]            Vital Signs (last 24 hours)       01/23 0700  -  01/24 0659 01/24 0700  -  01/24 1922   Most Recent "    Temp (°F) 98.1 -  100.1    98.6 -  98.8     98.8 (37.1)    Heart Rate 70 -  108    90 -  92     90    Resp 16 -  18      18     18    BP (!)85/46 -  128/64    101/64 -  110/72     110/72    SpO2 (%)   99       99          Lab Results (last 24 hours)     Procedure Component Value Units Date/Time    CBC & Differential [[id number]] Collected:  01/24/18 0449    Specimen:  Blood Updated:  01/24/18 0551    Narrative:       The following orders were created for panel order CBC & Differential.  Procedure                               Abnormality         Status                     ---------                               -----------         ------                     CBC Auto Differential[[id number]]        Abnormal            Final result                 Please view results for these tests on the individual orders.    CBC Auto Differential [[id number]]  (Abnormal) Collected:  01/24/18 0449    Specimen:  Blood Updated:  01/24/18 0551     WBC 23.74 (H) 10*3/mm3      RBC 3.27 (L) 10*6/mm3      Hemoglobin 8.5 (L) g/dL      Hematocrit 26.7 (L) %      MCV 81.7 fL      MCH 26.0 (L) pg      MCHC 31.8 (L) g/dL      RDW 13.7 (H) %      RDW-SD 41.2 fl      MPV 11.4 fL      Platelets 226 10*3/mm3      Neutrophil % 85.7 (H) %      Lymphocyte % 7.2 (L) %      Monocyte % 6.1 %      Eosinophil % 0.2 (L) %      Basophil % 0.1 %      Immature Grans % 0.7 (H) %      Neutrophils, Absolute 20.35 (H) 10*3/mm3      Lymphocytes, Absolute 1.71 10*3/mm3      Monocytes, Absolute 1.44 (H) 10*3/mm3      Eosinophils, Absolute 0.05 10*3/mm3      Basophils, Absolute 0.02 10*3/mm3      Immature Grans, Absolute 0.17 (H) 10*3/mm3            L&D Delivery Note  Date of Service: 1/23/2018 6:46 PM  Annabel Villarreal MD   Obstetrics      []Hide copied text  []Hover for attribution information  [facility]  Vaginal Delivery Note     Delivery      Delivery: Vaginal, Spontaneous Delivery     YOB: 2020    Time of Birth: 6:28 PM       Anesthesia: Epidural    Delivering clinician: Annabel Villarreal MD   Forceps?   No   Vacuum? No    Shoulder dystocia present: No         Delivery narrative:  Patient is a 17-year-old  that was admitted at 36 weeks and 2 days for induction of labor for cholestasis in pregnancy.  Patient had been diagnosed at 28 weeks and had been on ursodiol since that time and had been undergoing weekly  testing.  On admission patient's cervix was 1 cm and unfavorable.  She received 2 doses of Cytotec and became uncomfortable and received Stadol.  Patient was mackenzie too often for another dose of Cytotec and contractions were not being well monitored.  At that point she was 1-2 cm and had assisted rupture membranes for clear fluid and an IUPC was placed.  Patient received an epidural.  She did require Pitocin for inadequate contractions.  She then progressed along a normal primiparous labor curve to complete dilation and +1 station and was feeling a strong urge to push.     Patient delivered a liveborn female infant in the occiput anterior position over an intact perineum.  With maternal pushing and posterior guidance of the fetal head, the anterior shoulder was easily delivered followed by the remainder of the body.  The infant was immediately vigorous and placed on mom's chest.  Delayed cord clamping was performed for 30 seconds and the cord was clamped and cut by the father.  Cord blood was collected and sent.  The placenta then delivered spontaneously and was intact with a three-vessel cord.  Inspection of her perineum revealed a left periurethral laceration that was repaired with 3-0 Vicryl in a running fashion.  Bimanual exam revealed a firm uterus with no remaining products of conception.  Bleeding was minimal at the end of the procedure and perineum was hemostatic.  Patient tolerated the procedure well.  All counts were correct at the end of the procedure.     Infant     Findings: female  infant   Infant  "observations: Weight: No birth weight on file.   Length:    in  Observations/Comments:         Apgars: pending         Infant Name:              Placenta, Cord, and Fluid      Placenta delivered  Spontaneous    Cord: 3 vessels  present.   Nuchal Cord?  no    Cord blood obtained: Yes    Cord gases obtained:                    No    Cord gas results: Venous:  No results found for: PHCVEN      Arterial:  No results found for: PHCART       Repair     Episiotomy: Not recorded    Lacerations: Yes  Laceration Information  Laceration Repaired?   Perineal:         Periurethral:   Left   Yes   Labial:         Sulcus:         Vaginal:         Cervical:             Estimated Blood Loss:  200 mL   Suture used for repair: 3-0 Vicryl       Complications  none     Disposition  Mother to Mother Baby/Postpartum  in stable condition currently.  Baby to remains with mom  in stable condition currently.        Annabel Villarreal MD  01/23/18  7:22 PM                         Physician Progress Notes (last 24 hours) (Notes from 1/23/2018  7:22 PM through 1/24/2018  7:22 PM)      John Guerra MD at 1/24/2018 10:53 AM  Version 1 of 1         Subjective:  Postpartum Day 1:     The patient feels well.  Pain is well controlled with current medications. The baby is well.  Urinary output is adequate. The patient is ambulating well. The patient is tolerating a normal diet. Flatus has been passed.      Objective:    Vital signs in last 24 hours:  Blood pressure 101/64, pulse 92, temperature 98.6 °F (37 °C), temperature source Oral, resp. rate 18, height 154.9 cm (61\"), weight 85.2 kg (187 lb 12.8 oz), last menstrual period 05/25/2017, SpO2 99 %, currently breastfeeding.      General:    alert, appears stated age and cooperative   Uterine Fundus:   firm     Labs    Rh + / Female infant    Assessment/Plan:.     Postpartum Day #1  - Discussed importance of moving to prevent DVT.  - Discussed breast feeding, bleeding and pain " control.    John Guerra MD     I spent 25 minutes floor time/patient time/chart review and greater than 15 minutes in counseling of above issues.       Electronically signed by John Guerra MD at 1/24/2018 10:59 AM

## 2018-01-25 NOTE — PLAN OF CARE
Problem: Patient Care Overview (Adult)  Goal: Plan of Care Review  Outcome: Ongoing (interventions implemented as appropriate)    Goal: Adult Individualization and Mutuality  Outcome: Ongoing (interventions implemented as appropriate)    Goal: Discharge Needs Assessment  Outcome: Ongoing (interventions implemented as appropriate)      Problem: Postpartum, Vaginal Delivery (Adult)  Goal: Signs and Symptoms of Listed Potential Problems Will be Absent or Manageable (Postpartum, Vaginal Delivery)  Outcome: Ongoing (interventions implemented as appropriate)      Problem: Fall Risk (Adult)  Goal: Identify Related Risk Factors and Signs and Symptoms  Outcome: Ongoing (interventions implemented as appropriate)    Goal: Absence of Falls  Outcome: Ongoing (interventions implemented as appropriate)

## 2018-01-25 NOTE — DISCHARGE SUMMARY
Date of Discharge:  2018    Discharge Diagnosis:   Patient Active Problem List   Diagnosis   • Encounter for supervision of normal first pregnancy in first trimester   • Tobacco smoking affecting pregnancy   • Rh negative state in antepartum period   • Rubella non-immune status, antepartum   • Cholestasis during pregnancy   • Cholestasis during pregnancy in third trimester         Presenting Problem/History of Present Illness  Cholestasis during pregnancy in third trimester [O26.613, K83.1]       Hospital Course  Patient is a 17 y.o. female  36w3d presented with induction for cholestasis of pregnancy.  For events surrounding her delivery please see delivery/op note.  Her postpartum course was uneventful and today PPD#2, she is ready for discharge.  She meets all milestones and criteria for discharge and instructions were reviewed and she voiced understanding.     Procedures Performed  Vaginal delivery        Consults:   Consults     No orders found from 2017 to 2018.          Pertinent Test Results: none    Condition on Discharge:  stable    Discharge Disposition  Home or Self Care    Discharge Medications   Christine Berkowitz   Home Medication Instructions [place]:[id number]    Printed on:18 1002   Medication Information                      ferrous sulfate 325 (65 FE) MG tablet  Take 1 tablet by mouth Daily With Breakfast.             HYDROcodone-acetaminophen (NORCO) 5-325 MG per tablet  Take 1 tablet by mouth Every 4 (Four) Hours As Needed for Moderate Pain  for up to 8 days.             hydrOXYzine (VISTARIL) 25 MG capsule  Take 1 capsule by mouth 3 (Three) Times a Day As Needed for Itching.             ibuprofen (ADVIL,MOTRIN) 600 MG tablet  Take 1 tablet by mouth Every 8 (Eight) Hours As Needed for Mild Pain .             Prenatal MV & Min w/FA-DHA (PRENATAL ADULT GUMMY/DHA/FA PO)  Take  by mouth.                 Discharge Diet:   Diet Instructions     Advance Diet As Tolerated                      Activity at Discharge:   Activity Instructions     Pelvic Rest                     Follow-up Appointments  No future appointments.      Test Results Pending at Discharge       Perfecto Bruce MD  01/25/18  10:02 AM

## 2018-01-29 ENCOUNTER — TELEPHONE (OUTPATIENT)
Dept: LACTATION | Facility: HOSPITAL | Age: 18
End: 2018-01-29

## 2018-01-29 NOTE — TELEPHONE ENCOUNTER
Discharge follow up phone call. Denies questions/concerns at this time.  Encouraged to call [facility] for assistance as needed.

## 2018-02-27 ENCOUNTER — POSTPARTUM VISIT (OUTPATIENT)
Dept: OBSTETRICS AND GYNECOLOGY | Facility: CLINIC | Age: 18
End: 2018-02-27

## 2018-02-27 VITALS
HEART RATE: 80 BPM | SYSTOLIC BLOOD PRESSURE: 110 MMHG | DIASTOLIC BLOOD PRESSURE: 77 MMHG | BODY MASS INDEX: 31.23 KG/M2 | HEIGHT: 61 IN | WEIGHT: 165.4 LBS

## 2018-02-27 DIAGNOSIS — F41.8 POSTPARTUM ANXIETY: ICD-10-CM

## 2018-02-27 DIAGNOSIS — N89.8 VAGINAL DISCHARGE: ICD-10-CM

## 2018-02-27 PROCEDURE — 99214 OFFICE O/P EST MOD 30 MIN: CPT | Performed by: OBSTETRICS & GYNECOLOGY

## 2018-02-27 RX ORDER — SERTRALINE HYDROCHLORIDE 25 MG/1
25 TABLET, FILM COATED ORAL DAILY
Qty: 30 TABLET | Refills: 1 | Status: SHIPPED | OUTPATIENT
Start: 2018-02-27 | End: 2018-04-13 | Stop reason: SDUPTHER

## 2018-02-27 NOTE — PROGRESS NOTES
Francisco Javier Berkowitz is a 17 y.o. female here for 6 week post partum visit.  Pt is interested in Mirena IUD for BC    History of Present Illness  Patient is 6 weeks status post normal spontaneous vaginal delivery at 36 weeks for cholestasis.  Her pregnancy was only complicated by cholestasis.  Her postpartum course has been complicated by postpartum depression and anxiety.  Patient reports having significant anxiety and not being able to sleep because she is concerned over her baby's well-being.  She also reports periods of being very happy and then being very withdrawn.  She denies any past history of a psychiatric diagnosis.  She has never done counseling or medication in the past.  She denies any thoughts of harm to herself or others since her delivery but has had thoughts of harm to herself in the past many years ago.  She is no longer breast-feeding.  She does complain of an increase in vaginal discharge with a foul odor.  She is not having any vaginal bleeding today.  She denies any pain.  Patient is interested in Mirena IUD for contraception.    The following portions of the patient's history were reviewed and updated as appropriate: allergies, current medications, past family history, past medical history, past social history, past surgical history and problem list.    Review of Systems   Gastrointestinal: Negative for abdominal pain.   Genitourinary: Negative for pelvic pain.   Psychiatric/Behavioral: Positive for dysphoric mood. Negative for suicidal ideas. The patient is nervous/anxious.    All other systems reviewed and are negative.      Objective   Physical Exam  Physical Exam   Constitutional: She appears well-developed and well-nourished.   Cardiovascular: Normal rate and regular rhythm.    Pulmonary/Chest: Effort normal and breath sounds normal. Right breast exhibits no inverted nipple, no mass, no nipple discharge, no skin change and no tenderness. Left breast exhibits no inverted nipple, no  mass, no nipple discharge, no skin change and no tenderness.   Abdominal: Soft. She exhibits no distension. There is no tenderness.   Genitourinary: Vagina normal and uterus normal. There is no rash, tenderness, lesion or injury on the right labia. There is no rash, tenderness, lesion or injury on the left labia. Cervix exhibits no motion tenderness, no discharge and no friability. Right adnexum displays no mass, no tenderness and no fullness. Left adnexum displays no mass, no tenderness and no fullness.   Neurological: She is alert.   Psychiatric: She has a normal mood and affect.   Vitals reviewed.      Assessment/Plan   Christine was seen today for postpartum care.    Diagnoses and all orders for this visit:    Routine postpartum follow-up    Postpartum anxiety  -     sertraline (ZOLOFT) 25 MG tablet; Take 1 tablet by mouth Daily.    Vaginal discharge  -     NuSwab VG+ - Swab, Vagina    Discussed recommendations for counseling and/or medication.  Patient is currently unsure about counseling but will give her the information for counseling services.  She is open to starting medication and will start her on Zoloft 25 mg.  Discussed side effects of medication and that it may take 2-6 weeks to see an improvement.  Patient was counseled on Mirena IUD contraception and the insertion procedure and its risks.  A prior authorization will be completed and she will return for insertion procedure.  A vaginal swab was done today to rule out infection.  She should follow-up in 6 weeks for follow-up on depression and anxiety.

## 2018-03-02 ENCOUNTER — TELEPHONE (OUTPATIENT)
Dept: OBSTETRICS AND GYNECOLOGY | Facility: CLINIC | Age: 18
End: 2018-03-02

## 2018-03-02 LAB
A VAGINAE DNA VAG QL NAA+PROBE: ABNORMAL SCORE
BVAB2 DNA VAG QL NAA+PROBE: ABNORMAL SCORE
C ALBICANS DNA VAG QL NAA+PROBE: NEGATIVE
C GLABRATA DNA VAG QL NAA+PROBE: NEGATIVE
C TRACH RRNA SPEC QL NAA+PROBE: NEGATIVE
MEGA1 DNA VAG QL NAA+PROBE: ABNORMAL SCORE
N GONORRHOEA RRNA SPEC QL NAA+PROBE: NEGATIVE
T VAGINALIS RRNA SPEC QL NAA+PROBE: NEGATIVE

## 2018-03-02 RX ORDER — METRONIDAZOLE 500 MG/1
500 TABLET ORAL 2 TIMES DAILY
Qty: 14 TABLET | Refills: 0 | Status: SHIPPED | OUTPATIENT
Start: 2018-03-02 | End: 2018-03-09

## 2018-03-02 NOTE — TELEPHONE ENCOUNTER
----- Message from Annabel Villarreal MD sent at 3/2/2018  8:42 AM EST -----  Let patient know her vaginal swab was positive for BV and rx has been sent.

## 2018-04-13 ENCOUNTER — OFFICE VISIT (OUTPATIENT)
Dept: OBSTETRICS AND GYNECOLOGY | Facility: CLINIC | Age: 18
End: 2018-04-13

## 2018-04-13 VITALS
SYSTOLIC BLOOD PRESSURE: 108 MMHG | HEART RATE: 80 BPM | BODY MASS INDEX: 30.81 KG/M2 | HEIGHT: 61 IN | WEIGHT: 163.2 LBS | DIASTOLIC BLOOD PRESSURE: 75 MMHG

## 2018-04-13 DIAGNOSIS — Z30.430 ENCOUNTER FOR INSERTION OF MIRENA IUD: Primary | ICD-10-CM

## 2018-04-13 DIAGNOSIS — F41.8 POSTPARTUM ANXIETY: ICD-10-CM

## 2018-04-13 PROCEDURE — 58300 INSERT INTRAUTERINE DEVICE: CPT | Performed by: OBSTETRICS & GYNECOLOGY

## 2018-04-13 PROCEDURE — 99213 OFFICE O/P EST LOW 20 MIN: CPT | Performed by: OBSTETRICS & GYNECOLOGY

## 2018-04-13 NOTE — PROGRESS NOTES
CC: Pt here for Mirena insertion and f/u for postpartum anxiety     Lot#:[id number]   Exp:10/2020   NDC: 56725-230-33    Pt did not have reaction to medication or insertion    Subjective   Christine Berkowitz is a 17 y.o. female       History of Present Illness  Patient presents today for Mirena insertion in follow-up for postpartum anxiety.  Patient was started on 25 mg of Zoloft at her postpartum appointment.  Patient denies any side effects but states that she has not seen much of an improvement.  She continues to deny any thoughts of harm to herself or others.  She reports that she is having difficulty falling asleep at night and still has episodes of feeling very withdrawn.  Patient has not looked into counseling.    The following portions of the patient's history were reviewed and updated as appropriate: allergies, current medications, past family history, past medical history, past social history, past surgical history and problem list.    Review of Systems   Psychiatric/Behavioral: Positive for dysphoric mood. The patient is nervous/anxious.    All other systems reviewed and are negative.      Objective   Physical Exam   Constitutional: She appears well-developed and well-nourished.   Neurological: She is alert.   Psychiatric: She has a normal mood and affect.   Vitals reviewed.        Assessment/Plan   Christine was seen today for follow-up and procedure.    Diagnoses and all orders for this visit:    Encounter for insertion of mirena IUD  -     levonorgestrel (MIRENA) 20 MCG/24HR IUD; by Intrauterine route 1 (One) Time.    Postpartum anxiety  -     sertraline (ZOLOFT) 50 MG tablet; Take 1 tablet by mouth Daily.     will increase her Zoloft to 50 mg but patient was again counseled on recommendations for counseling.  She will be seen back in one month for string check all follow-up on postpartum anxiety.  Explained to patient that if the 50 mg of Zoloft is not helping, would recommend seeing a  psychiatrist.    Procedure: Mirena Intrauterine device insertion  Preoperative diagnosis: Desires LARC  Postoperative diagnosis: Same  Indications:  Patient counseled on all forms of birth control and discussed their risks, side effects, and bleeding profiles.  Patient has elected to try Mirena.  Findings: Small, mobile retroverted uterus  Anesthesia: None  Pathology: None  Estimated blood loss: Less than 5 mL  Complications: None    The risks, benefits, and alternatives to Mirena were explained at length with the patient. All her questions were answered and consents were signed.  The patient was placed in a dorsal lithotomy position on the examining table in [facility]. A bimanual exam confirmed the uterus was normal in size, retroverted, and midplane. A warmed metal speculum was inserted into the vagina and the cervix was brought into view. The cervix was prepped with Betadine. The anterior lip was grasped with a single-tooth tenaculum. The endometrial cavity was then sounded to 7 cm without use of a dilator. Gloves were then exchanged for sterile gloves. This sealed Mirena package was opened and the Mirena was removed in a sterile fashion.  The upper edge of the depth setting the flange was set at a uterine sound measured. The  was then carefully advanced to the cervical canal into the uterus to the level of the fundus. This was then backed off about 1.5-2 cm to allow sufficient space for the arms to open. The slider was then retracted about 1 cm and deployed the device. The device was then gently advanced to the fundus. The Mirena was then released by pulling the slider down all the way. The  was removed carefully from the uterus. The threads were then cut leaving 2-3 cm visible outside of the cervix.  The single-tooth tenaculum was removed from the anterior lip. Silver nitrate was applied to the tenaculum sites. Good hemostasis was noted. All other instruments were removed from the vagina. There  were no complications, and the patient tolerated the procedure well with a minimal amount of discomfort. The patient was counseled about the need to return in 4 weeks for string check. She was counseled about the need to use a backup method of contraception such as condoms until her post insertion exam was performed. The patient verbalized understanding that the Mirena will need to be removed/replaced after 5 years. The patient is counseled to contact us if she has any significant or increasing bleeding, pain, fever, chills, or other concerns. She is instructed to see a doctor right away if she believes that she may be pregnant at any time with the Mirena in place.

## 2018-08-10 DIAGNOSIS — F41.8 POSTPARTUM ANXIETY: ICD-10-CM

## 2018-09-08 DIAGNOSIS — F41.8 POSTPARTUM ANXIETY: ICD-10-CM

## 2018-10-03 ENCOUNTER — OFFICE VISIT (OUTPATIENT)
Dept: OBSTETRICS AND GYNECOLOGY | Facility: CLINIC | Age: 18
End: 2018-10-03

## 2018-10-03 VITALS
HEART RATE: 85 BPM | SYSTOLIC BLOOD PRESSURE: 115 MMHG | WEIGHT: 168 LBS | DIASTOLIC BLOOD PRESSURE: 75 MMHG | BODY MASS INDEX: 31.76 KG/M2

## 2018-10-03 DIAGNOSIS — Z30.431 IUD CHECK UP: Primary | ICD-10-CM

## 2018-10-03 PROCEDURE — 99213 OFFICE O/P EST LOW 20 MIN: CPT | Performed by: OBSTETRICS & GYNECOLOGY

## 2018-10-03 NOTE — PROGRESS NOTES
Subjective   Christine Berkowitz is a 18 y.o. female here for string check    History of Present Illness   Patient presents for Mirena IUD string check and to f/u on increased dose of zoloft.  She was last seen 6 months ago and did not keep followup.  Her zoloft was increased to 50 mg at her last visit.  She reports moods are slightly improved and she has started counseling.  She has only had two counseling sessions.  She reports irregular bleeding since Mirena was placed.  She denies any pelvic pain or cramping.     The following portions of the patient's history were reviewed and updated as appropriate: allergies, current medications, past family history, past medical history, past social history, past surgical history and problem list.    Review of Systems   Genitourinary: Positive for menstrual problem.   Psychiatric/Behavioral: Negative for suicidal ideas and depressed mood. The patient is not nervous/anxious.    All other systems reviewed and are negative.      Objective   Physical Exam  Physical Exam   Constitutional: She appears well-developed and well-nourished.   Abdominal: Soft. She exhibits no distension. There is no tenderness.   Genitourinary: Vagina normal and uterus normal. There is no rash, tenderness, lesion or injury on the right labia. There is no rash, tenderness, lesion or injury on the left labia. Cervix exhibits no motion tenderness, no discharge and no friability. Right adnexum displays no mass, no tenderness and no fullness. Left adnexum displays no mass, no tenderness and no fullness.   Genitourinary Comments: IUD strings visualized   Neurological: She is alert.   Psychiatric: She has a normal mood and affect.   Vitals reviewed.      Assessment/Plan   Christine was seen today for follow-up.    Diagnoses and all orders for this visit:    IUD check up    Postpartum depression    Patient may continue on zoloft 50 mg and should continue with counseling.  IUD strings visualized and discussed normal  bleeding patterns after placement.  Patient to followup in March or April for annual exam or sooner if needed.

## 2018-10-30 DIAGNOSIS — F41.8 POSTPARTUM ANXIETY: ICD-10-CM

## 2020-09-24 ENCOUNTER — OFFICE VISIT (OUTPATIENT)
Dept: INTERNAL MEDICINE | Facility: CLINIC | Age: 20
End: 2020-09-24

## 2020-09-24 VITALS
TEMPERATURE: 97.3 F | HEIGHT: 62 IN | SYSTOLIC BLOOD PRESSURE: 100 MMHG | OXYGEN SATURATION: 95 % | RESPIRATION RATE: 16 BRPM | WEIGHT: 157.4 LBS | BODY MASS INDEX: 28.97 KG/M2 | HEART RATE: 95 BPM | DIASTOLIC BLOOD PRESSURE: 62 MMHG

## 2020-09-24 DIAGNOSIS — R53.83 LOW ENERGY: ICD-10-CM

## 2020-09-24 DIAGNOSIS — F32.9 MAJOR DEPRESSIVE DISORDER, REMISSION STATUS UNSPECIFIED, UNSPECIFIED WHETHER RECURRENT: ICD-10-CM

## 2020-09-24 DIAGNOSIS — M62.81 MUSCLE WEAKNESS: ICD-10-CM

## 2020-09-24 DIAGNOSIS — R68.89 HEAT INTOLERANCE: ICD-10-CM

## 2020-09-24 DIAGNOSIS — R53.83 FATIGUE, UNSPECIFIED TYPE: ICD-10-CM

## 2020-09-24 DIAGNOSIS — F41.1 GENERALIZED ANXIETY DISORDER: Primary | ICD-10-CM

## 2020-09-24 PROBLEM — Z67.91 RH NEGATIVE STATE IN ANTEPARTUM PERIOD: Status: RESOLVED | Noted: 2017-09-06 | Resolved: 2020-09-24

## 2020-09-24 PROBLEM — O26.613 CHOLESTASIS DURING PREGNANCY IN THIRD TRIMESTER: Status: RESOLVED | Noted: 2018-01-22 | Resolved: 2020-09-24

## 2020-09-24 PROBLEM — O99.330 TOBACCO SMOKING AFFECTING PREGNANCY: Status: RESOLVED | Noted: 2017-07-19 | Resolved: 2020-09-24

## 2020-09-24 PROBLEM — O09.899 RUBELLA NON-IMMUNE STATUS, ANTEPARTUM: Status: RESOLVED | Noted: 2017-09-06 | Resolved: 2020-09-24

## 2020-09-24 PROBLEM — O26.899 RH NEGATIVE STATE IN ANTEPARTUM PERIOD: Status: RESOLVED | Noted: 2017-09-06 | Resolved: 2020-09-24

## 2020-09-24 PROBLEM — K83.1 CHOLESTASIS DURING PREGNANCY: Status: RESOLVED | Noted: 2017-12-06 | Resolved: 2020-09-24

## 2020-09-24 PROBLEM — O26.619 CHOLESTASIS DURING PREGNANCY: Status: RESOLVED | Noted: 2017-12-06 | Resolved: 2020-09-24

## 2020-09-24 PROBLEM — K83.1 CHOLESTASIS DURING PREGNANCY IN THIRD TRIMESTER: Status: RESOLVED | Noted: 2018-01-22 | Resolved: 2020-09-24

## 2020-09-24 PROBLEM — Z34.01 ENCOUNTER FOR SUPERVISION OF NORMAL FIRST PREGNANCY IN FIRST TRIMESTER: Status: RESOLVED | Noted: 2017-07-19 | Resolved: 2020-09-24

## 2020-09-24 PROBLEM — Z28.39 RUBELLA NON-IMMUNE STATUS, ANTEPARTUM: Status: RESOLVED | Noted: 2017-09-06 | Resolved: 2020-09-24

## 2020-09-24 PROCEDURE — 99204 OFFICE O/P NEW MOD 45 MIN: CPT | Performed by: NURSE PRACTITIONER

## 2020-09-24 RX ORDER — PROPRANOLOL HYDROCHLORIDE 20 MG/1
20 TABLET ORAL 3 TIMES DAILY PRN
COMMUNITY
Start: 2020-08-24 | End: 2023-01-06

## 2020-09-24 RX ORDER — CITALOPRAM 20 MG/1
20 TABLET ORAL
COMMUNITY
Start: 2020-08-24 | End: 2023-01-06

## 2020-09-24 RX ORDER — BUPROPION HYDROCHLORIDE 300 MG/1
300 TABLET ORAL EVERY MORNING
COMMUNITY
Start: 2020-08-24

## 2020-09-24 NOTE — PROGRESS NOTES
Subjective    Christine Berkowitz is a 20 y.o. female presenting today for   Chief Complaint   Patient presents with   • Establish Care     unable to sweat    • Fatigue   • Nausea   • Depression       History of Present Illness     Christine Berkowitz presents today as a new patient to me to establish care.   Prior PCP was no one for many years.  Patient Care Team:  Erika Barone APRN as PCP - General (Family Medicine)  Rocco Barrow MD as Consulting Physician (Obstetrics and Gynecology)    Current/chronic health conditions include:    Patient Active Problem List   Diagnosis   (none) - all problems resolved or deleted         Current Outpatient Medications:   •  buPROPion XL (WELLBUTRIN XL) 300 MG 24 hr tablet, Take 300 mg by mouth Every Morning., Disp: , Rfl:   •  citalopram (CeleXA) 20 MG tablet, Take 20 mg by mouth every night at bedtime., Disp: , Rfl:   •  propranolol (INDERAL) 20 MG tablet, Take 20 mg by mouth 3 (Three) Times a Day As Needed., Disp: , Rfl:     Pt has a history LAVONNE, MDD, and sleep disturbance. Her current therapy includes Wellbutrin, Celexa, and Propranolol. She is f/b [facility]. These medications are prescribed by [facility]. Since starting Wellbutrin, she reports a 20# wgt loss. She reports decreased appetite.    New complaints today include:  Pt reports heat intolerance. In the heat she becomes very nauseated and dizzy. She reports an inability to sweat. The issue dates back to high school. It is unchanged since that time.     She reports she believes she has low iron. She reports a general lack of energy and fatigue. There is associated muscle weakness. This issue has been chronic for many years. It is unchanged.        The following portions of the patient's history were reviewed and updated as appropriate: allergies, current medications, problem list, past medical history, past surgical history, family history, and social history.     Review of Systems   Constitutional:  "Positive for appetite change, fatigue and unexpected weight loss. Negative for chills and fever.   HENT: Negative.    Eyes: Negative.    Respiratory: Negative.    Cardiovascular: Negative.    Gastrointestinal: Negative.    Endocrine: Positive for heat intolerance.   Genitourinary: Negative.    Musculoskeletal: Negative.    Skin: Negative.    Neurological: Positive for dizziness. Negative for headache.   Hematological: Negative for adenopathy. Bruises/bleeds easily.   Psychiatric/Behavioral: Negative.        Objective    Vitals:    09/24/20 1431   BP: 100/62   BP Location: Right arm   Patient Position: Sitting   Cuff Size: Adult   Pulse: 95   Resp: 16   Temp: 97.3 °F (36.3 °C)   TempSrc: Temporal   SpO2: 95%   Weight: 71.4 kg (157 lb 6.4 oz)   Height: 157.5 cm (62\")     Body mass index is 28.79 kg/m².  Nursing notes and vitals reviewed.    Physical Exam  Constitutional:       Appearance: Normal appearance. She is well-developed.   HENT:      Head: Normocephalic.      Right Ear: Hearing, tympanic membrane, ear canal and external ear normal.      Left Ear: Hearing, tympanic membrane, ear canal and external ear normal.      Nose: Nose normal. No mucosal edema or rhinorrhea.      Mouth/Throat:      Pharynx: Uvula midline.      Tonsils: No tonsillar exudate.   Eyes:      General: Lids are normal.      Conjunctiva/sclera: Conjunctivae normal.      Pupils: Pupils are equal, round, and reactive to light.   Neck:      Musculoskeletal: Normal range of motion and neck supple.      Thyroid: No thyroid mass or thyromegaly.   Cardiovascular:      Rate and Rhythm: Regular rhythm.      Pulses: Normal pulses.      Heart sounds: S1 normal and S2 normal. No murmur. No friction rub. No gallop.    Pulmonary:      Effort: Pulmonary effort is normal.      Breath sounds: Normal breath sounds. No wheezing, rhonchi or rales.   Abdominal:      General: Bowel sounds are normal.      Palpations: Abdomen is soft.      Tenderness: There is no " abdominal tenderness. There is no guarding.      Hernia: No hernia is present.   Musculoskeletal: Normal range of motion.         General: No deformity.   Lymphadenopathy:      Cervical: No cervical adenopathy.   Skin:     General: Skin is warm and dry.      Findings: No lesion or rash.   Neurological:      Mental Status: She is alert and oriented to person, place, and time.      Cranial Nerves: No cranial nerve deficit.      Sensory: No sensory deficit.      Deep Tendon Reflexes: Reflexes are normal and symmetric.   Psychiatric:         Attention and Perception: She is attentive.         Speech: Speech normal.         Behavior: Behavior normal.         Thought Content: Thought content normal.         No results found for this or any previous visit (from the past 672 hour(s)).      Assessment and Plan    Christine was seen today for establish care, fatigue, nausea and depression.    Diagnoses and all orders for this visit:    Generalized anxiety disorder  Comments:  - f/b psych    Major depressive disorder, remission status unspecified, unspecified whether recurrent  Comments:  - f/b psych    Heat intolerance  -     CBC & Differential; Future  -     Comprehensive Metabolic Panel; Future  -     Ferritin; Future  -     Iron Profile; Future  -     Thyroid Cascade Profile; Future  -     Vitamin B12; Future  -     Vitamin D 25 Hydroxy; Future  -     Magnesium; Future    Fatigue, unspecified type  -     CBC & Differential; Future  -     Comprehensive Metabolic Panel; Future  -     Ferritin; Future  -     Iron Profile; Future  -     Thyroid Cascade Profile; Future  -     Vitamin B12; Future  -     Vitamin D 25 Hydroxy; Future  -     Magnesium; Future    Low energy  -     CBC & Differential; Future  -     Comprehensive Metabolic Panel; Future  -     Ferritin; Future  -     Iron Profile; Future  -     Thyroid Cascade Profile; Future  -     Vitamin B12; Future  -     Vitamin D 25 Hydroxy; Future  -     Magnesium;  Future    Muscle weakness  -     CBC & Differential; Future  -     Comprehensive Metabolic Panel; Future  -     Ferritin; Future  -     Iron Profile; Future  -     Thyroid Cascade Profile; Future  -     Vitamin B12; Future  -     Vitamin D 25 Hydroxy; Future  -     Magnesium; Future              The plan of care was discussed. All questions were answered. Patient verbalized understanding.        Return in about 3 weeks (around 10/15/2020) for Annual physical.

## 2020-09-29 ENCOUNTER — RESULTS ENCOUNTER (OUTPATIENT)
Dept: INTERNAL MEDICINE | Facility: CLINIC | Age: 20
End: 2020-09-29

## 2020-09-29 DIAGNOSIS — R53.83 LOW ENERGY: ICD-10-CM

## 2020-09-29 DIAGNOSIS — M62.81 MUSCLE WEAKNESS: ICD-10-CM

## 2020-09-29 DIAGNOSIS — R68.89 HEAT INTOLERANCE: ICD-10-CM

## 2020-09-29 DIAGNOSIS — R53.83 FATIGUE, UNSPECIFIED TYPE: ICD-10-CM

## 2020-10-03 LAB
25(OH)D3+25(OH)D2 SERPL-MCNC: 32.1 NG/ML (ref 30–100)
ALBUMIN SERPL-MCNC: 4.9 G/DL (ref 3.5–5.2)
ALBUMIN/GLOB SERPL: 2.6 G/DL
ALP SERPL-CCNC: 75 U/L (ref 39–117)
ALT SERPL-CCNC: 14 U/L (ref 1–33)
AST SERPL-CCNC: 18 U/L (ref 1–32)
BASOPHILS # BLD AUTO: 0.03 10*3/MM3 (ref 0–0.2)
BASOPHILS NFR BLD AUTO: 0.4 % (ref 0–1.5)
BILIRUB SERPL-MCNC: 0.8 MG/DL (ref 0–1.2)
BUN SERPL-MCNC: 15 MG/DL (ref 6–20)
BUN/CREAT SERPL: 16.9 (ref 7–25)
CALCIUM SERPL-MCNC: 9.2 MG/DL (ref 8.6–10.5)
CHLORIDE SERPL-SCNC: 102 MMOL/L (ref 98–107)
CO2 SERPL-SCNC: 23.7 MMOL/L (ref 22–29)
CREAT SERPL-MCNC: 0.89 MG/DL (ref 0.57–1)
EOSINOPHIL # BLD AUTO: 0.06 10*3/MM3 (ref 0–0.4)
EOSINOPHIL NFR BLD AUTO: 0.8 % (ref 0.3–6.2)
ERYTHROCYTE [DISTWIDTH] IN BLOOD BY AUTOMATED COUNT: 12.7 % (ref 12.3–15.4)
FERRITIN SERPL-MCNC: 28.4 NG/ML (ref 13–150)
GLOBULIN SER CALC-MCNC: 1.9 GM/DL
GLUCOSE SERPL-MCNC: 88 MG/DL (ref 65–99)
HCT VFR BLD AUTO: 40 % (ref 34–46.6)
HGB BLD-MCNC: 13.2 G/DL (ref 12–15.9)
IMM GRANULOCYTES # BLD AUTO: 0.03 10*3/MM3 (ref 0–0.05)
IMM GRANULOCYTES NFR BLD AUTO: 0.4 % (ref 0–0.5)
IRON SATN MFR SERPL: 19 % (ref 20–50)
IRON SERPL-MCNC: 84 MCG/DL (ref 37–145)
LYMPHOCYTES # BLD AUTO: 1.55 10*3/MM3 (ref 0.7–3.1)
LYMPHOCYTES NFR BLD AUTO: 21.5 % (ref 19.6–45.3)
MAGNESIUM SERPL-MCNC: 2 MG/DL (ref 1.6–2.6)
MCH RBC QN AUTO: 29.7 PG (ref 26.6–33)
MCHC RBC AUTO-ENTMCNC: 33 G/DL (ref 31.5–35.7)
MCV RBC AUTO: 89.9 FL (ref 79–97)
MONOCYTES # BLD AUTO: 0.37 10*3/MM3 (ref 0.1–0.9)
MONOCYTES NFR BLD AUTO: 5.1 % (ref 5–12)
NEUTROPHILS # BLD AUTO: 5.17 10*3/MM3 (ref 1.7–7)
NEUTROPHILS NFR BLD AUTO: 71.8 % (ref 42.7–76)
NRBC BLD AUTO-RTO: 0 /100 WBC (ref 0–0.2)
PLATELET # BLD AUTO: 268 10*3/MM3 (ref 140–450)
POTASSIUM SERPL-SCNC: 4.7 MMOL/L (ref 3.5–5.2)
PROT SERPL-MCNC: 6.8 G/DL (ref 6–8.5)
RBC # BLD AUTO: 4.45 10*6/MM3 (ref 3.77–5.28)
SODIUM SERPL-SCNC: 137 MMOL/L (ref 136–145)
TIBC SERPL-MCNC: 444 MCG/DL
TSH SERPL DL<=0.005 MIU/L-ACNC: 2.9 UIU/ML (ref 0.45–4.5)
UIBC SERPL-MCNC: 360 MCG/DL (ref 112–346)
VIT B12 SERPL-MCNC: 275 PG/ML (ref 211–946)
WBC # BLD AUTO: 7.21 10*3/MM3 (ref 3.4–10.8)

## 2023-01-06 ENCOUNTER — OFFICE VISIT (OUTPATIENT)
Dept: OBSTETRICS AND GYNECOLOGY | Facility: CLINIC | Age: 23
End: 2023-01-06
Payer: COMMERCIAL

## 2023-01-06 VITALS
HEIGHT: 62 IN | WEIGHT: 167 LBS | HEART RATE: 74 BPM | SYSTOLIC BLOOD PRESSURE: 108 MMHG | BODY MASS INDEX: 30.73 KG/M2 | DIASTOLIC BLOOD PRESSURE: 74 MMHG

## 2023-01-06 DIAGNOSIS — Z01.419 ENCOUNTER FOR GYNECOLOGICAL EXAMINATION: Primary | ICD-10-CM

## 2023-01-06 DIAGNOSIS — N39.3 STRESS INCONTINENCE IN FEMALE: ICD-10-CM

## 2023-01-06 DIAGNOSIS — R19.00 PELVIC MASS: ICD-10-CM

## 2023-01-06 DIAGNOSIS — Z97.5 IUD (INTRAUTERINE DEVICE) IN PLACE: ICD-10-CM

## 2023-01-06 PROCEDURE — 99213 OFFICE O/P EST LOW 20 MIN: CPT | Performed by: OBSTETRICS & GYNECOLOGY

## 2023-01-06 PROCEDURE — 99385 PREV VISIT NEW AGE 18-39: CPT | Performed by: OBSTETRICS & GYNECOLOGY

## 2023-01-06 PROCEDURE — 3008F BODY MASS INDEX DOCD: CPT | Performed by: OBSTETRICS & GYNECOLOGY

## 2023-01-06 PROCEDURE — 2014F MENTAL STATUS ASSESS: CPT | Performed by: OBSTETRICS & GYNECOLOGY

## 2023-01-06 RX ORDER — LEVONORGESTREL 52 MG/1
1 INTRAUTERINE DEVICE INTRAUTERINE
COMMUNITY

## 2023-01-06 NOTE — PROGRESS NOTES
Chief Complaint  Annual Exam- Pap-Due  Wants to talk about replacing IUD    Subjective        Christine Berkowitz presents to [facility]  History of Present Illness  Patient is a 22-year-old that presents for gynecological exam.  She had a Mirena IUD placed 5 years ago and does report light, irregular bleeding with IUD.  She also reports occasional leaking of urine with cough or sneeze and also sometimes difficulty holding urine with urge.  She states that when it occurs, it only happens for couple of days and then will not reoccur for a long period of time.  She does have a family history of breast cancer in her paternal and maternal grandmothers.  She reports she does do self breast exams.  Objective   Vital Signs:  /74   Pulse 74   Ht 157.5 cm (62\")   Wt 75.8 kg (167 lb)   BMI 30.54 kg/m²   Estimated body mass index is 30.54 kg/m² as calculated from the following:    Height as of this encounter: 157.5 cm (62\").    Weight as of this encounter: 75.8 kg (167 lb).          Physical Exam  Vitals reviewed. Exam conducted with a chaperone present.   Constitutional:       Appearance: She is well-developed.   Cardiovascular:      Rate and Rhythm: Normal rate and regular rhythm.   Pulmonary:      Effort: Pulmonary effort is normal.      Breath sounds: Normal breath sounds.   Chest:   Breasts:     Right: No inverted nipple, mass, nipple discharge, skin change or tenderness.      Left: No inverted nipple, mass, nipple discharge, skin change or tenderness.   Abdominal:      General: There is no distension.      Palpations: Abdomen is soft.      Tenderness: There is no abdominal tenderness.   Genitourinary:     Labia:         Right: No rash, tenderness, lesion or injury.         Left: No rash, tenderness, lesion or injury.       Vagina: Normal.      Cervix: Normal.      Uterus: Normal.       Adnexa:         Right: No mass, tenderness or fullness.          Left: No mass,  tenderness or fullness.        Comments: IUD strings visualized.  A pelvic mass was felt in the posterior cul-de-sac that felt nonmobile.  Neurological:      Mental Status: She is alert.        Result Review :                Assessment and Plan   Diagnoses and all orders for this visit:    1. Encounter for gynecological examination (Primary)  -     IGP,CtNgTv,rfx Aptima HPV ASCU    2. Stress incontinence in female  -     Ambulatory Referral to Physical Therapy Pelvic Floor    3. IUD (intrauterine device) in place    4. Pelvic mass  -     US Non-ob Transvaginal; Future    She was counseled on monthly self breast exams for breast health.  She was advised that the Mirena IUD is now approved for 8 years for contraception and she would like to keep her IUD in at this time.  Discussed pelvic floor therapy for treatment of incontinence and she would like a referral.  The pelvic mass was also thought to be felt in the posterior cul-de-sac on exam that was nonmobile, but it was nontender.  Recommend pelvic ultrasound to rule out uterine or adnexal mass.         Follow Up   Return in about 1 year (around 1/6/2024) for gynecological exam.  Patient was given instructions and counseling regarding her condition or for health maintenance advice. Please see specific information pulled into the AVS if appropriate.

## 2023-01-10 ENCOUNTER — PATIENT MESSAGE (OUTPATIENT)
Dept: OBSTETRICS AND GYNECOLOGY | Facility: CLINIC | Age: 23
End: 2023-01-10
Payer: COMMERCIAL

## 2023-01-10 ENCOUNTER — PATIENT ROUNDING (BHMG ONLY) (OUTPATIENT)
Dept: OBSTETRICS AND GYNECOLOGY | Facility: CLINIC | Age: 23
End: 2023-01-10
Payer: COMMERCIAL

## 2023-01-10 LAB
C TRACH RRNA CVX QL NAA+PROBE: NEGATIVE
CONV .: NORMAL
CYTOLOGIST CVX/VAG CYTO: NORMAL
CYTOLOGY CVX/VAG DOC CYTO: NORMAL
CYTOLOGY CVX/VAG DOC THIN PREP: NORMAL
DX ICD CODE: NORMAL
HIV 1 & 2 AB SER-IMP: NORMAL
N GONORRHOEA RRNA CVX QL NAA+PROBE: NEGATIVE
OTHER STN SPEC: NORMAL
STAT OF ADQ CVX/VAG CYTO-IMP: NORMAL
T VAGINALIS RRNA SPEC QL NAA+PROBE: NEGATIVE

## 2023-01-10 NOTE — PROGRESS NOTES
My chart message has been sent to the patient for PATIENT ROUNDING with [facility].

## 2023-01-27 ENCOUNTER — TELEPHONE (OUTPATIENT)
Dept: OBSTETRICS AND GYNECOLOGY | Facility: CLINIC | Age: 23
End: 2023-01-27
Payer: COMMERCIAL

## 2023-01-27 DIAGNOSIS — N83.202 LEFT OVARIAN CYST: Primary | ICD-10-CM

## 2023-01-27 NOTE — TELEPHONE ENCOUNTER
Braden, can you please call patient and schedule her for a pelvic ultrasound and GYN follow-up with me in 1 month.    I called and reviewed ultrasound with the patient and explained that she has a 9 cm simple appearing cyst on the left ovary.  This is not causing her any pain and she was given the option of proceeding with surgical management versus follow-up in 1 month with repeat ultrasound to see if the cyst is decreasing in size.  She would like to return in 1 month with repeat ultrasound.  She was advised to call sooner if she begins having pain.

## 2023-02-08 ENCOUNTER — HOSPITAL ENCOUNTER (OUTPATIENT)
Dept: PHYSICAL THERAPY | Facility: HOSPITAL | Age: 23
Setting detail: THERAPIES SERIES
Discharge: HOME | End: 2023-02-08
Payer: COMMERCIAL

## 2023-02-08 DIAGNOSIS — R35.0 URINARY FREQUENCY: ICD-10-CM

## 2023-02-08 DIAGNOSIS — M62.81 MUSCLE WEAKNESS: ICD-10-CM

## 2023-02-08 DIAGNOSIS — N39.3 SUI (STRESS URINARY INCONTINENCE, FEMALE): Primary | ICD-10-CM

## 2023-02-08 PROCEDURE — 97110 THERAPEUTIC EXERCISES: CPT

## 2023-02-08 PROCEDURE — 97162 PT EVAL MOD COMPLEX 30 MIN: CPT

## 2023-02-08 NOTE — THERAPY EVALUATION
Outpatient Physical Therapy Pelvic Health Initial Evaluation  [facility]     Patient Name: Christine Berkowitz  : 2000  MRN: [id number]  Today's Date: 2023        Visit Date: 2023    Patient Active Problem List   Diagnosis   (none) - all problems resolved or deleted        No past medical history on file.     No past surgical history on file.      Visit Dx:    ICD-10-CM ICD-9-CM   1. ALYSSA (stress urinary incontinence, female)  N39.3 625.6   2. Urinary frequency  R35.0 788.41   3. Muscle weakness  M62.81 728.87        Patient History     Row Name 23 1600             History    Occupation/sports/leisure activities SAHM  -CC         Pain     Pain at Present 0  -CC            User Key  (r) = Recorded By, (t) = Taken By, (c) = Cosigned By    Initials Name Provider Type    CC Mariah Ortiz, PT Physical Therapist                 Pelvic Health     Row Name 23 1500             Pregnancy Questions    Number of Pregnancies 1  -CC      Number of Miscarriages 0  -CC      Has the patient had an ? No  -CC      Number of Children 1  -CC         Lumbosacral Palpation    Lumbosacral Palpation Comments no TTP  -CC         Pelvic Floor Muscle    Internal Pelvic Floor Comments discussed internal exam, unable to perform today d/t pt late arrival, pt to discern if she is interested in internal exam and notify therapist at next visit.  -CC         Observations    Posture Observations paradoxical breathing  -CC         Education Provided On:    Education Points HEP;Behavioral modifications;Other (comment)  -CC      Method of Delivery Verbal;Written  -CC      Education Provided To Patient  -CC      Level of Understanding Verbalized  -CC      HEP Comments Access Code [email or web address]  -CC      Education Comments Education provided on PFM/core/breathing/hips/back in relation to s/s utilizing PF model. Discussed eval findings and how pt can benefit from PF PT.  -CC         Incontinence Impact Questionnaire     Ability to do household chores (cooking, housecleaning, laundry)? 3  -CC      Physical recreation such as walking, swimming, or other exercise? 2  -CC      Entertainment activities (movies, concerts, etc)? 3  -CC      Ability to travel by car or bus more than 30 minutes from home? 3  -CC      Participation in social activities outside your home? 3  -CC      Emotional health (nervousness, depression, etc.)? 2  -CC      Feeling frustrated? 2  -CC      Incontinence Impact Total 18  -CC         General ROM    GENERAL ROM COMMENTS lumbar ROM WNL  -CC         MMT (Manual Muscle Testing)    Rt Lower Ext Rt Hip Flexion;Rt Hip ABduction  -CC      Lt Lower Ext Lt Hip Flexion;Lt Hip ABduction  -CC      General MMT Comments Abdominal Brace/TrA performed with bulging abd; tactile and verbal cues for TrA and able to isolate  -CC         MMT Right Lower Ext    Rt Hip Flexion MMT, Gross Movement (4-/5) good minus  -CC      Rt Hip ABduction MMT, Gross Movement (4-/5) good minus  -CC         MMT Left Lower Ext    Lt Hip Flexion MMT, Gross Movement (4-/5) good minus  -CC      Lt Hip ABduction MMT, Gross Movement (4-/5) good minus  -CC            User Key  (r) = Recorded By, (t) = Taken By, (c) = Cosigned By    Initials Name Provider Type    Mariah Haro, PT Physical Therapist                              PT Assessment/Plan     Row Name 23 1600          PT Assessment    Functional Limitations Limitation in home management;Limitations in community activities;Limitations in functional capacity and performance;Performance in leisure activities;Performance in self-care ADL  -CC     Impairments Posture;Poor body mechanics;Muscle strength;Coordination;Endurance  -CC     Assessment Comments Christinesa RENÉE Berkowitz is a 22 y.o. female referred to physical therapy for ALYSSA and urinary frequency. . She presents with an evolving clinical presentation, along with  comorbidities of current cyst on L ovary undergoing work up, hx of  UTIs and no remarkable personal factors that may impact her progress in the plan of care. Pt presents today with paradoxical breathing, decreased core muscle coordination, and general core/hip weakness. Although unable to perform internal PFM exam due to pt arriving late to today's eval, but anticipate decreased PFM strength and coordination based on subjective intake. Her signs and symptoms are consistent with referring diagnosis. The previous impairments limit her ability to laugh/cough/sneeze/jump without urinary leakage, participate in ADLs, and play with her child without urinary incontinence. Pt will benefit from skilled PT to address the previous impairments and return to PLOF.  -CC     Please refer to paper survey for additional self-reported information Yes  -CC     Rehab Potential Fair  -CC     Patient/caregiver participated in establishment of treatment plan and goals Yes  -CC     Patient would benefit from skilled therapy intervention Yes  -CC        PT Plan    PT Frequency 1x/week  -CC     Predicted Duration of Therapy Intervention (PT) 6-10 visits  -CC     Planned CPT's? PT EVAL MOD COMPLELITY: 56589;PT RE-EVAL: 84148;PT THER PROC EA 15 MIN: 12415;PT THER ACT EA 15 MIN: 03127;PT MANUAL THERAPY EA 15 MIN: 43854;PT NEUROMUSC RE-EDUCATION EA 15 MIN: 60333;PT GAIT TRAINING EA 15 MIN: 04411;PT SELF CARE/HOME MGMT/TRAIN EA 15: 66656;PT HOT OR COLD PACK TREAT MCARE;PT BIOFEEDBACK RAULITO/ANO/URETHRAL: 11231  -CC     PT Plan Comments F/u on cyst work up. Review bladder diary and discuss bladder irritents and water intake. If pt agreeable complete internal assessment and work on Quick Flicks and cough with PFM.  -CC           User Key  (r) = Recorded By, (t) = Taken By, (c) = Cosigned By    Initials Name Provider Type    Mariah Haro, PT Physical Therapist                   OP Exercises     Row Name 02/08/23 1500             Subjective Comments    Subjective Comments Christine Berkowitz is a 22 y.o. female  "who presents today with ALYSSA. Pt arrives late for today's evaluation, so limited time to assess. Reports started about 5 years ago during pregnancy. Reports irregular menstrual cycle, has IUD. Reports irregular ALYSSA s/s. UI occurs with coughing/sneezing/laughing/jumping. Recent finding of cyst on L ovary, currently undergoing monitoring via US to determine surgery vs nonsurgical management. Sexually active, denies pain with sex. Denies pain/burning with urination. Occasional constipation. Not sure about regular water consumption, drinks a lot of coke zero, coffee, tea, lemonade. Denies difficulty with voiding. Reports urinary frequency. PMH includes L ovarian cyst, , UTI.  Arrives at 3:38 for 3:30 eval.  -CC         Total Minutes    96045 - PT Therapeutic Exercise Minutes 10  -CC         Exercise 1    Exercise Name 1 TrA Brace  -CC      Cueing 1 Verbal;Tactile  -CC      Reps 1 5  -CC      Time 1 5 sec  -CC      Additional Comments cues to \"bring ASIS together an in\"  -CC         Exercise 2    Exercise Name 2 HL march w/ TRA  -CC      Cueing 2 Verbal  -CC      Reps 2 5 + discussed for HEP  -CC         Exercise 3    Exercise Name 3 360 breathing in HL  -CC      Cueing 3 Verbal  -CC      Reps 3 3  -CC      Additional Comments one hand on chest and one on belly  -CC         Exercise 4    Exercise Name 4 s/l hip abd  -CC      Cueing 4 Verbal  -CC      Reps 4 5 + discussed for HEP  -CC            User Key  (r) = Recorded By, (t) = Taken By, (c) = Cosigned By    Initials Name Provider Type    Mariah Haro, PT Physical Therapist                             PT OP Goals     Row Name 23 1600          PT Short Term Goals    STG Date to Achieve 03/10/23  -CC     STG 1 Patient will be independent in the performance of a home program of PFM exercises on a daily basis to increase muscle strength and recruitment as measured with digital palpation in order to increase continence control.  -CC     STG 1 Progress " "New  -     STG 2 Patient will demonstrate use of functional PFM contraction by performing a \"pelvic brace\" or \"knack\" in order to reduce or eliminate UI during a cough.  -     STG 2 Progress New  -        Long Term Goals    LTG Date to Achieve 04/09/23  -     LTG 1 Pt will demo at least 4/5 hip strength to improve participation in ADls without episodes of ALYSSA.  -     LTG 1 Progress New  -     LTG 2 Patient will increase voiding intervals from intervals to at least 120 minutes in order to decrease voiding frequency and interruption from ADLs.  -     LTG 2 Progress New  -     LTG 3 Patient will demonstrate independent contraction of the pelvic floor muscle to 4/5 MMT without overflow muscle activity with or without verbal cues.  -     LTG 3 Progress New  -     LTG 4 Pt will decreased score on IIQ Short Form to </=12 to show improved QOL.  -     LTG 4 Progress New  -        Time Calculation    PT Goal Re-Cert Due Date 05/09/23  -           User Key  (r) = Recorded By, (t) = Taken By, (c) = Cosigned By    Initials Name Provider Type    CC Mariah Ortiz, PT Physical Therapist                                Time Calculation:   Start Time: 1538  Stop Time: 1618  Time Calculation (min): 40 min  Total Timed Code Minutes- PT: 10 minute(s)  Timed Charges  90302 - PT Therapeutic Exercise Minutes: 10  Untimed Charges  PT Eval/Re-eval Minutes: 30  Total Minutes  Timed Charges Total Minutes: 10  Untimed Charges Total Minutes: 30   Total Minutes: 40  Therapy Charges for Today     Code Description Service Date Service Provider Modifiers Qty    [id number]  PT THER PROC EA 15 MIN 2/8/2023 Mariah Ortiz, PT GP 1    [id number]  PT EVAL MOD COMPLEXITY 2 2/8/2023 Mariah Ortiz, PT GP 1                  Mariah Ortiz PT  2/8/2023     "

## 2023-03-03 ENCOUNTER — OFFICE VISIT (OUTPATIENT)
Dept: OBSTETRICS AND GYNECOLOGY | Facility: CLINIC | Age: 23
End: 2023-03-03
Payer: COMMERCIAL

## 2023-03-03 VITALS
SYSTOLIC BLOOD PRESSURE: 118 MMHG | DIASTOLIC BLOOD PRESSURE: 75 MMHG | WEIGHT: 166.6 LBS | HEART RATE: 70 BPM | BODY MASS INDEX: 30.47 KG/M2

## 2023-03-03 DIAGNOSIS — N83.202 LEFT OVARIAN CYST: Primary | ICD-10-CM

## 2023-03-03 PROCEDURE — 99214 OFFICE O/P EST MOD 30 MIN: CPT | Performed by: OBSTETRICS & GYNECOLOGY

## 2023-03-03 NOTE — PROGRESS NOTES
"Chief Complaint  Follow-up- Pt here for follow up from ultrasound     Subjective        Christine Berkowitz presents to [facility]  History of Present Illness  Patient presents today for follow-up for left ovarian cyst.  She had a 9 cm simple appearing left ovarian cyst approximately 1 month ago and elected for observation.  She returns today and cyst is still present on ultrasound.  She is asymptomatic from the cyst.  Objective   Vital Signs:  /75   Pulse 70   Wt 75.6 kg (166 lb 9.6 oz)   BMI 30.47 kg/m²   Estimated body mass index is 30.47 kg/m² as calculated from the following:    Height as of 1/6/23: 157.5 cm (62\").    Weight as of this encounter: 75.6 kg (166 lb 9.6 oz).             Physical Exam  Vitals reviewed.   Constitutional:       Appearance: Normal appearance.   Cardiovascular:      Rate and Rhythm: Normal rate and regular rhythm.   Pulmonary:      Effort: Pulmonary effort is normal.      Breath sounds: Normal breath sounds.   Abdominal:      General: Abdomen is flat. There is no distension.      Palpations: Abdomen is soft.      Tenderness: There is no abdominal tenderness.   Neurological:      General: No focal deficit present.      Mental Status: She is alert. Mental status is at baseline.   Psychiatric:         Mood and Affect: Mood normal.         Thought Content: Thought content normal.        Result Review :                   Assessment and Plan   Diagnoses and all orders for this visit:    1. Left ovarian cyst (Primary)  -     Case Request  -     CBC (No Diff); Future  -     Pregnancy, Urine - Urine, Clean Catch; Future    Other orders  -     Follow Anesthesia Guidelines / Protocol; Future  -     Obtain Informed Consent; Future  -     Provide NPO Instructions to Patient; Future  -     Chlorhexidine Skin Prep; Future  -     Follow Anesthesia Guidelines / Protocol; Standing  -     Verify / Perform Chlorhexidine Skin Prep; Standing  -     Verify / " Perform Chlorhexidine Skin Prep if Indicated (If Not Already Completed); Standing  -     SCD (Sequential Compression Device) - Place on Patient in Pre-Op; Standing    The left ovarian cyst is still present and has not decreased in size.  Due to its large size, I did discuss with patient that I would recommend removal due to risk of torsion.  I also discussed that she has the option of observation since she is asymptomatic.  She would like to proceed with surgical removal.  I discussed with her that we will plan laparoscopic left ovarian cystectomy and I did discuss with her the possibility for oophorectomy.  Discussed risks including risk of infection, bleeding, damage to surrounding structures, risk of anesthesia, blood clots, heart attack, and stroke.  Discussed postoperative recovery and restrictions.  She understands all risks and wishes to proceed with laparoscopic left ovarian cystectomy.         Follow Up   No follow-ups on file.  Patient was given instructions and counseling regarding her condition or for health maintenance advice. Please see specific information pulled into the AVS if appropriate.

## 2023-03-07 PROBLEM — N83.202 LEFT OVARIAN CYST: Status: ACTIVE | Noted: 2023-03-07

## 2023-04-13 ENCOUNTER — TELEPHONE (OUTPATIENT)
Dept: OBSTETRICS AND GYNECOLOGY | Facility: CLINIC | Age: 23
End: 2023-04-13

## 2023-04-14 ENCOUNTER — PRE-ADMISSION TESTING (OUTPATIENT)
Dept: PREADMISSION TESTING | Facility: HOSPITAL | Age: 23
End: 2023-04-14
Payer: COMMERCIAL

## 2023-04-14 VITALS
OXYGEN SATURATION: 98 % | HEART RATE: 88 BPM | WEIGHT: 160 LBS | TEMPERATURE: 98.2 F | RESPIRATION RATE: 20 BRPM | DIASTOLIC BLOOD PRESSURE: 79 MMHG | BODY MASS INDEX: 29.44 KG/M2 | SYSTOLIC BLOOD PRESSURE: 112 MMHG | HEIGHT: 62 IN

## 2023-04-14 LAB — HCG SERPL QL: NEGATIVE

## 2023-04-14 PROCEDURE — 84703 CHORIONIC GONADOTROPIN ASSAY: CPT

## 2023-04-14 PROCEDURE — 85027 COMPLETE CBC AUTOMATED: CPT | Performed by: OBSTETRICS & GYNECOLOGY

## 2023-04-14 NOTE — DISCHARGE INSTRUCTIONS
Take the following medications the morning of surgery:  WELLBUTRIN    ARRIVE TO MAIN OR DESK 4-18-23 AT 12:00PM      If you are on prescription narcotic pain medication to control your pain you may also take that medication the morning of surgery.    General Instructions:  Do not eat solid food after midnight the night before surgery.  You may drink clear liquids day of surgery but must stop at least one hour before your hospital arrival time.  It is beneficial for you to have a clear drink that contains carbohydrates the day of surgery.  We suggest a 12 to 20 ounce bottle of Gatorade or Powerade for non-diabetic patients or a 12 to 20 ounce bottle of G2 or Powerade Zero for diabetic patients. (Pediatric patients, are not advised to drink a 12 to 20 ounce carbohydrate drink)    Clear liquids are liquids you can see through.  Nothing red in color.     Plain water                               Sports drinks  Sodas                                   Gelatin (Jell-O)  Fruit juices without pulp such as white grape juice and apple juice  Popsicles that contain no fruit or yogurt  Tea or coffee (no cream or milk added)  Gatorade / Powerade  G2 / Powerade Zero    Infants may have breast milk up to four hours before surgery.  Infants drinking formula may drink formula up to six hours before surgery.   Patients who avoid smoking, chewing tobacco and alcohol for 4 weeks prior to surgery have a reduced risk of post-operative complications.  Quit smoking as many days before surgery as you can.  Do not smoke, use chewing tobacco or drink alcohol the day of surgery.   If applicable bring your C-PAP/ BI-PAP machine in with you to preop day of surgery.  Bring any papers given to you in the doctor’s office.  Wear clean comfortable clothes.  Do not wear contact lenses, false eyelashes or make-up.  Bring a case for your glasses.   Bring crutches or walker if applicable.  Remove all piercings.  Leave jewelry and any other valuables at  home.  Hair extensions with metal clips must be removed prior to surgery.  The Pre-Admission Testing nurse will instruct you to bring medications if unable to obtain an accurate list in Pre-Admission Testing.        Preventing a Surgical Site Infection:  For 2 to 3 days before surgery, avoid shaving with a razor because the razor can irritate skin and make it easier to develop an infection.    Any areas of open skin can increase the risk of a post-operative wound infection by allowing bacteria to enter and travel throughout the body.  Notify your surgeon if you have any skin wounds / rashes even if it is not near the expected surgical site.  The area will need assessed to determine if surgery should be delayed until it is healed.  The night prior to surgery shower using a fresh bar of anti-bacterial soap (such as Dial) and clean washcloth.  Sleep in a clean bed with clean clothing.  Do not allow pets to sleep with you.  Shower on the morning of surgery using a fresh bar of anti-bacterial soap (such as Dial) and clean washcloth.  Dry with a clean towel and dress in clean clothing.  Ask your surgeon if you will be receiving antibiotics prior to surgery.  Make sure you, your family, and all healthcare providers clean their hands with soap and water or an alcohol based hand  before caring for you or your wound.    Day of surgery:  Your arrival time is approximately two hours before your scheduled surgery time.  Upon arrival, a Pre-op nurse and Anesthesiologist will review your health history, obtain vital signs, and answer questions you may have.  The only belongings needed at this time will be a list of your home medications and if applicable your C-PAP/BI-PAP machine.  A Pre-op nurse will start an IV and you may receive medication in preparation for surgery, including something to help you relax.     Please be aware that surgery does come with discomfort.  We want to make every effort to control your  discomfort so please discuss any uncontrolled symptoms with your nurse.   Your doctor will most likely have prescribed pain medications.      If you are going home after surgery you will receive individualized written care instructions before being discharged.  A responsible adult must drive you to and from the hospital on the day of your surgery and stay with you for 24 hours.  Discharge prescriptions can be filled by the hospital pharmacy during regular pharmacy hours.  If you are having surgery late in the day/evening your prescription may be e-prescribed to your pharmacy.  Please verify your pharmacy hours or chose a 24 hour pharmacy to avoid not having access to your prescription because your pharmacy has closed for the day.    If you are staying overnight following surgery, you will be transported to your hospital room following the recovery period.  [facility] has all private rooms.    If you have any questions please call Pre-Admission Testing at [phone].  Deductibles and co-payments are collected on the day of service. Please be prepared to pay the required co-pay, deductible or deposit on the day of service as defined by your plan.    Call your surgeon immediately if you experience any of the following symptoms:  Sore Throat  Shortness of Breath or difficulty breathing  Cough  Chills  Body soreness or muscle pain  Headache  Fever  New loss of taste or smell  Do not arrive for your surgery ill.  Your procedure will need to be rescheduled to another time.  You will need to call your physician before the day of surgery to avoid any unnecessary exposure to hospital staff as well as other patients.         CHLORHEXIDINE CLOTH INSTRUCTIONS  The morning of surgery follow these instructions using the Chlorhexidine cloths you've been given.  These steps reduce bacteria on the body.  Do not use the cloths near your eyes, ears mouth, genitalia or on open wounds.  Throw the cloths away after use  but do not try to flush them down a toilet.      Open and remove one cloth at a time from the package.    Leave the cloth unfolded and begin the bathing.  Massage the skin with the cloths using gentle pressure to remove bacteria.  Do not scrub harshly.   Follow the steps below with one 2% CHG cloth per area (6 total cloths).  One cloth for neck, shoulders and chest.  One cloth for both arms, hands, fingers and underarms (do underarms last).  One cloth for the abdomen followed by groin.  One cloth for right leg and foot including between the toes.  One cloth for left leg and foot including between the toes.  The last cloth is to be used for the back of the neck, back and buttocks.    Allow the CHG to air dry 3 minutes on the skin which will give it time to work and decrease the chance of irritation.  The skin may feel sticky until it is dry.  Do not rinse with water or any other liquid or you will lose the beneficial effects of the CHG.  If mild skin irritation occurs, do rinse the skin to remove the CHG.  Report this to the nurse at time of admission.  Do not apply lotions, creams, ointments, deodorants or perfumes after using the clothes. Dress in clean clothes before coming to the hospital.

## 2023-04-18 ENCOUNTER — ANESTHESIA EVENT (OUTPATIENT)
Dept: PERIOP | Facility: HOSPITAL | Age: 23
End: 2023-04-18
Payer: COMMERCIAL

## 2023-04-18 ENCOUNTER — HOSPITAL ENCOUNTER (OUTPATIENT)
Facility: HOSPITAL | Age: 23
Setting detail: HOSPITAL OUTPATIENT SURGERY
Discharge: HOME | End: 2023-04-18
Attending: OBSTETRICS & GYNECOLOGY | Admitting: OBSTETRICS & GYNECOLOGY
Payer: COMMERCIAL

## 2023-04-18 ENCOUNTER — ANESTHESIA (OUTPATIENT)
Dept: PERIOP | Facility: HOSPITAL | Age: 23
End: 2023-04-18
Payer: COMMERCIAL

## 2023-04-18 VITALS
DIASTOLIC BLOOD PRESSURE: 78 MMHG | HEART RATE: 76 BPM | SYSTOLIC BLOOD PRESSURE: 110 MMHG | TEMPERATURE: 98.6 F | OXYGEN SATURATION: 100 % | RESPIRATION RATE: 16 BRPM

## 2023-04-18 DIAGNOSIS — N83.202 LEFT OVARIAN CYST: ICD-10-CM

## 2023-04-18 PROCEDURE — 25010000002 FENTANYL CITRATE (PF) 50 MCG/ML SOLUTION: Performed by: ANESTHESIOLOGY

## 2023-04-18 PROCEDURE — 88305 TISSUE EXAM BY PATHOLOGIST: CPT | Performed by: OBSTETRICS & GYNECOLOGY

## 2023-04-18 PROCEDURE — 25010000002 KETOROLAC TROMETHAMINE PER 15 MG: Performed by: ANESTHESIOLOGY

## 2023-04-18 PROCEDURE — 25010000002 MIDAZOLAM PER 1 MG: Performed by: ANESTHESIOLOGY

## 2023-04-18 PROCEDURE — 25010000002 ROPIVACAINE PER 1 MG: Performed by: OBSTETRICS & GYNECOLOGY

## 2023-04-18 PROCEDURE — 25010000002 MAGNESIUM SULFATE PER 500 MG OF MAGNESIUM: Performed by: ANESTHESIOLOGY

## 2023-04-18 PROCEDURE — 88307 TISSUE EXAM BY PATHOLOGIST: CPT | Performed by: OBSTETRICS & GYNECOLOGY

## 2023-04-18 PROCEDURE — 25010000002 DEXAMETHASONE PER 1 MG: Performed by: ANESTHESIOLOGY

## 2023-04-18 PROCEDURE — 88112 CYTOPATH CELL ENHANCE TECH: CPT | Performed by: OBSTETRICS & GYNECOLOGY

## 2023-04-18 PROCEDURE — 25010000002 ONDANSETRON PER 1 MG: Performed by: ANESTHESIOLOGY

## 2023-04-18 PROCEDURE — 25010000002 PROPOFOL 10 MG/ML EMULSION: Performed by: ANESTHESIOLOGY

## 2023-04-18 RX ORDER — OXYCODONE HYDROCHLORIDE AND ACETAMINOPHEN 5; 325 MG/1; MG/1
1-2 TABLET ORAL EVERY 6 HOURS PRN
Qty: 20 TABLET | Refills: 0 | Status: SHIPPED | OUTPATIENT
Start: 2023-04-18

## 2023-04-18 RX ORDER — HYDROCODONE BITARTRATE AND ACETAMINOPHEN 7.5; 325 MG/1; MG/1
1 TABLET ORAL ONCE AS NEEDED
Status: DISCONTINUED | OUTPATIENT
Start: 2023-04-18 | End: 2023-04-18 | Stop reason: HOSPADM

## 2023-04-18 RX ORDER — KETOROLAC TROMETHAMINE 30 MG/ML
INJECTION, SOLUTION INTRAMUSCULAR; INTRAVENOUS AS NEEDED
Status: DISCONTINUED | OUTPATIENT
Start: 2023-04-18 | End: 2023-04-18 | Stop reason: SURG

## 2023-04-18 RX ORDER — SODIUM CHLORIDE 9 MG/ML
INJECTION, SOLUTION INTRAVENOUS AS NEEDED
Status: DISCONTINUED | OUTPATIENT
Start: 2023-04-18 | End: 2023-04-18 | Stop reason: HOSPADM

## 2023-04-18 RX ORDER — KETAMINE HCL IN NACL, ISO-OSM 100MG/10ML
SYRINGE (ML) INJECTION AS NEEDED
Status: DISCONTINUED | OUTPATIENT
Start: 2023-04-18 | End: 2023-04-18 | Stop reason: SURG

## 2023-04-18 RX ORDER — ROPIVACAINE HYDROCHLORIDE 5 MG/ML
INJECTION, SOLUTION EPIDURAL; INFILTRATION; PERINEURAL AS NEEDED
Status: DISCONTINUED | OUTPATIENT
Start: 2023-04-18 | End: 2023-04-18 | Stop reason: HOSPADM

## 2023-04-18 RX ORDER — FENTANYL CITRATE 50 UG/ML
50 INJECTION, SOLUTION INTRAMUSCULAR; INTRAVENOUS
Status: DISCONTINUED | OUTPATIENT
Start: 2023-04-18 | End: 2023-04-18 | Stop reason: HOSPADM

## 2023-04-18 RX ORDER — ONDANSETRON 2 MG/ML
4 INJECTION INTRAMUSCULAR; INTRAVENOUS ONCE AS NEEDED
Status: DISCONTINUED | OUTPATIENT
Start: 2023-04-18 | End: 2023-04-18 | Stop reason: HOSPADM

## 2023-04-18 RX ORDER — MIDAZOLAM HYDROCHLORIDE 1 MG/ML
1 INJECTION INTRAMUSCULAR; INTRAVENOUS
Status: DISCONTINUED | OUTPATIENT
Start: 2023-04-18 | End: 2023-04-18 | Stop reason: HOSPADM

## 2023-04-18 RX ORDER — LABETALOL HYDROCHLORIDE 5 MG/ML
5 INJECTION, SOLUTION INTRAVENOUS
Status: DISCONTINUED | OUTPATIENT
Start: 2023-04-18 | End: 2023-04-18 | Stop reason: HOSPADM

## 2023-04-18 RX ORDER — LIDOCAINE HYDROCHLORIDE 10 MG/ML
0.5 INJECTION, SOLUTION EPIDURAL; INFILTRATION; INTRACAUDAL; PERINEURAL ONCE AS NEEDED
Status: DISCONTINUED | OUTPATIENT
Start: 2023-04-18 | End: 2023-04-18 | Stop reason: HOSPADM

## 2023-04-18 RX ORDER — LIDOCAINE HYDROCHLORIDE 20 MG/ML
INJECTION, SOLUTION INFILTRATION; PERINEURAL AS NEEDED
Status: DISCONTINUED | OUTPATIENT
Start: 2023-04-18 | End: 2023-04-18 | Stop reason: SURG

## 2023-04-18 RX ORDER — NALOXONE HCL 0.4 MG/ML
0.2 VIAL (ML) INJECTION AS NEEDED
Status: DISCONTINUED | OUTPATIENT
Start: 2023-04-18 | End: 2023-04-18 | Stop reason: HOSPADM

## 2023-04-18 RX ORDER — DEXMEDETOMIDINE HYDROCHLORIDE 100 UG/ML
INJECTION, SOLUTION INTRAVENOUS AS NEEDED
Status: DISCONTINUED | OUTPATIENT
Start: 2023-04-18 | End: 2023-04-18 | Stop reason: SURG

## 2023-04-18 RX ORDER — SODIUM CHLORIDE 0.9 % (FLUSH) 0.9 %
3-10 SYRINGE (ML) INJECTION AS NEEDED
Status: DISCONTINUED | OUTPATIENT
Start: 2023-04-18 | End: 2023-04-18 | Stop reason: HOSPADM

## 2023-04-18 RX ORDER — HYDRALAZINE HYDROCHLORIDE 20 MG/ML
5 INJECTION INTRAMUSCULAR; INTRAVENOUS
Status: DISCONTINUED | OUTPATIENT
Start: 2023-04-18 | End: 2023-04-18 | Stop reason: HOSPADM

## 2023-04-18 RX ORDER — SODIUM CHLORIDE 0.9 % (FLUSH) 0.9 %
3 SYRINGE (ML) INJECTION EVERY 12 HOURS SCHEDULED
Status: DISCONTINUED | OUTPATIENT
Start: 2023-04-18 | End: 2023-04-18 | Stop reason: HOSPADM

## 2023-04-18 RX ORDER — DROPERIDOL 2.5 MG/ML
0.62 INJECTION, SOLUTION INTRAMUSCULAR; INTRAVENOUS
Status: DISCONTINUED | OUTPATIENT
Start: 2023-04-18 | End: 2023-04-18 | Stop reason: HOSPADM

## 2023-04-18 RX ORDER — PROMETHAZINE HYDROCHLORIDE 25 MG/1
25 TABLET ORAL ONCE AS NEEDED
Status: DISCONTINUED | OUTPATIENT
Start: 2023-04-18 | End: 2023-04-18 | Stop reason: HOSPADM

## 2023-04-18 RX ORDER — IPRATROPIUM BROMIDE AND ALBUTEROL SULFATE 2.5; .5 MG/3ML; MG/3ML
3 SOLUTION RESPIRATORY (INHALATION) ONCE AS NEEDED
Status: DISCONTINUED | OUTPATIENT
Start: 2023-04-18 | End: 2023-04-18 | Stop reason: HOSPADM

## 2023-04-18 RX ORDER — FENTANYL CITRATE 50 UG/ML
INJECTION, SOLUTION INTRAMUSCULAR; INTRAVENOUS AS NEEDED
Status: DISCONTINUED | OUTPATIENT
Start: 2023-04-18 | End: 2023-04-18 | Stop reason: SURG

## 2023-04-18 RX ORDER — FAMOTIDINE 10 MG/ML
20 INJECTION, SOLUTION INTRAVENOUS ONCE
Status: COMPLETED | OUTPATIENT
Start: 2023-04-18 | End: 2023-04-18

## 2023-04-18 RX ORDER — DEXAMETHASONE SODIUM PHOSPHATE 4 MG/ML
INJECTION, SOLUTION INTRA-ARTICULAR; INTRALESIONAL; INTRAMUSCULAR; INTRAVENOUS; SOFT TISSUE AS NEEDED
Status: DISCONTINUED | OUTPATIENT
Start: 2023-04-18 | End: 2023-04-18 | Stop reason: SURG

## 2023-04-18 RX ORDER — HYDROMORPHONE HYDROCHLORIDE 1 MG/ML
0.5 INJECTION, SOLUTION INTRAMUSCULAR; INTRAVENOUS; SUBCUTANEOUS
Status: DISCONTINUED | OUTPATIENT
Start: 2023-04-18 | End: 2023-04-18 | Stop reason: HOSPADM

## 2023-04-18 RX ORDER — SODIUM CHLORIDE, SODIUM LACTATE, POTASSIUM CHLORIDE, CALCIUM CHLORIDE 600; 310; 30; 20 MG/100ML; MG/100ML; MG/100ML; MG/100ML
9 INJECTION, SOLUTION INTRAVENOUS CONTINUOUS
Status: DISCONTINUED | OUTPATIENT
Start: 2023-04-18 | End: 2023-04-18 | Stop reason: HOSPADM

## 2023-04-18 RX ORDER — FLUMAZENIL 0.1 MG/ML
0.2 INJECTION INTRAVENOUS AS NEEDED
Status: DISCONTINUED | OUTPATIENT
Start: 2023-04-18 | End: 2023-04-18 | Stop reason: HOSPADM

## 2023-04-18 RX ORDER — MAGNESIUM SULFATE HEPTAHYDRATE 500 MG/ML
INJECTION, SOLUTION INTRAMUSCULAR; INTRAVENOUS AS NEEDED
Status: DISCONTINUED | OUTPATIENT
Start: 2023-04-18 | End: 2023-04-18 | Stop reason: SURG

## 2023-04-18 RX ORDER — PROPOFOL 10 MG/ML
VIAL (ML) INTRAVENOUS AS NEEDED
Status: DISCONTINUED | OUTPATIENT
Start: 2023-04-18 | End: 2023-04-18 | Stop reason: SURG

## 2023-04-18 RX ORDER — DIPHENHYDRAMINE HYDROCHLORIDE 50 MG/ML
12.5 INJECTION INTRAMUSCULAR; INTRAVENOUS
Status: DISCONTINUED | OUTPATIENT
Start: 2023-04-18 | End: 2023-04-18 | Stop reason: HOSPADM

## 2023-04-18 RX ORDER — FAMOTIDINE 10 MG/ML
20 INJECTION, SOLUTION INTRAVENOUS ONCE
Status: DISCONTINUED | OUTPATIENT
Start: 2023-04-18 | End: 2023-04-18 | Stop reason: HOSPADM

## 2023-04-18 RX ORDER — PROMETHAZINE HYDROCHLORIDE 25 MG/1
25 SUPPOSITORY RECTAL ONCE AS NEEDED
Status: DISCONTINUED | OUTPATIENT
Start: 2023-04-18 | End: 2023-04-18 | Stop reason: HOSPADM

## 2023-04-18 RX ORDER — OXYCODONE AND ACETAMINOPHEN 7.5; 325 MG/1; MG/1
1 TABLET ORAL EVERY 4 HOURS PRN
Status: DISCONTINUED | OUTPATIENT
Start: 2023-04-18 | End: 2023-04-18 | Stop reason: HOSPADM

## 2023-04-18 RX ORDER — ROCURONIUM BROMIDE 10 MG/ML
INJECTION, SOLUTION INTRAVENOUS AS NEEDED
Status: DISCONTINUED | OUTPATIENT
Start: 2023-04-18 | End: 2023-04-18 | Stop reason: SURG

## 2023-04-18 RX ORDER — EPHEDRINE SULFATE 50 MG/ML
5 INJECTION, SOLUTION INTRAVENOUS ONCE AS NEEDED
Status: DISCONTINUED | OUTPATIENT
Start: 2023-04-18 | End: 2023-04-18 | Stop reason: HOSPADM

## 2023-04-18 RX ORDER — IBUPROFEN 600 MG/1
600 TABLET ORAL EVERY 6 HOURS PRN
Qty: 30 TABLET | Refills: 0 | Status: SHIPPED | OUTPATIENT
Start: 2023-04-18

## 2023-04-18 RX ORDER — ONDANSETRON 2 MG/ML
INJECTION INTRAMUSCULAR; INTRAVENOUS AS NEEDED
Status: DISCONTINUED | OUTPATIENT
Start: 2023-04-18 | End: 2023-04-18 | Stop reason: SURG

## 2023-04-18 RX ADMIN — FENTANYL CITRATE 50 MCG: 50 INJECTION, SOLUTION INTRAMUSCULAR; INTRAVENOUS at 15:19

## 2023-04-18 RX ADMIN — MAGNESIUM SULFATE HEPTAHYDRATE 2 G: 500 INJECTION, SOLUTION INTRAMUSCULAR; INTRAVENOUS at 15:50

## 2023-04-18 RX ADMIN — SODIUM CHLORIDE, POTASSIUM CHLORIDE, SODIUM LACTATE AND CALCIUM CHLORIDE 9 ML/HR: 600; 310; 30; 20 INJECTION, SOLUTION INTRAVENOUS at 14:12

## 2023-04-18 RX ADMIN — MIDAZOLAM 1 MG: 1 INJECTION INTRAMUSCULAR; INTRAVENOUS at 14:09

## 2023-04-18 RX ADMIN — Medication 40 MG: at 15:19

## 2023-04-18 RX ADMIN — LIDOCAINE HYDROCHLORIDE 100 MG: 20 INJECTION, SOLUTION INFILTRATION; PERINEURAL at 16:44

## 2023-04-18 RX ADMIN — LIDOCAINE HYDROCHLORIDE 100 MG: 20 INJECTION, SOLUTION INFILTRATION; PERINEURAL at 15:19

## 2023-04-18 RX ADMIN — PROPOFOL 180 MG: 10 INJECTION, EMULSION INTRAVENOUS at 15:19

## 2023-04-18 RX ADMIN — Medication 10 MG: at 16:19

## 2023-04-18 RX ADMIN — DEXAMETHASONE SODIUM PHOSPHATE 6 MG: 4 INJECTION, SOLUTION INTRA-ARTICULAR; INTRALESIONAL; INTRAMUSCULAR; INTRAVENOUS; SOFT TISSUE at 15:27

## 2023-04-18 RX ADMIN — KETOROLAC TROMETHAMINE 30 MG: 30 INJECTION, SOLUTION INTRAMUSCULAR at 16:25

## 2023-04-18 RX ADMIN — OXYCODONE HYDROCHLORIDE AND ACETAMINOPHEN 1 TABLET: 7.5; 325 TABLET ORAL at 17:35

## 2023-04-18 RX ADMIN — ONDANSETRON 4 MG: 2 INJECTION INTRAMUSCULAR; INTRAVENOUS at 15:54

## 2023-04-18 RX ADMIN — DEXMEDETOMIDINE 20 MCG: 100 INJECTION, SOLUTION, CONCENTRATE INTRAVENOUS at 15:19

## 2023-04-18 RX ADMIN — ROCURONIUM BROMIDE 50 MG: 10 INJECTION, SOLUTION INTRAVENOUS at 15:19

## 2023-04-18 RX ADMIN — FAMOTIDINE 20 MG: 10 INJECTION INTRAVENOUS at 14:09

## 2023-04-18 NOTE — ANESTHESIA POSTPROCEDURE EVALUATION
Patient: Christine Berkowitz    Procedure Summary     Date: 04/18/23 Room / Location: [facility] OR [address] [place] [address] MAIN OR    Anesthesia Start: 1505 Anesthesia Stop: 1700    Procedure: LAPAROSCOPIC LEFT OVARIAN CYSTECTOMY (Left: Abdomen) Diagnosis:       Left ovarian cyst      (Left ovarian cyst [N83.202])    Surgeons: Annabel Villarreal MD Provider: Hugo Jacinto MD    Anesthesia Type: general ASA Status: 2          Anesthesia Type: general    Vitals  Vitals Value Taken Time   /79 04/18/23 1752   Temp 37 °C (98.6 °F) 04/18/23 1659   Pulse 75 04/18/23 1801   Resp 16 04/18/23 1752   SpO2 98 % 04/18/23 1801   Vitals shown include unvalidated device data.        Post Anesthesia Care and Evaluation    Patient location during evaluation: bedside  Pain management: adequate    Airway patency: patent  Anesthetic complications: No anesthetic complications    Cardiovascular status: acceptable  Respiratory status: acceptable  Hydration status: acceptable

## 2023-04-18 NOTE — OP NOTE
Laparoscopic left ovarian cystectomy     Indications: The patient was admitted for planned ovarian cystectomy.  She had been followed for a 9 cm simple appearing left ovarian cyst and had elected to proceed with surgical management.  She was counseled on risks, benefits, and alternatives and wished to proceed.       Surgeon: Annabel Villarreal MD     Assistants: Benoit Miramontes MD   was responsible for performing the following activities: Retraction, Suction, Irrigation and Held/Positioned Camera and their skilled assistance was necessary for the success of this case.    Anesthesia: General endotracheal anesthesia    Procedure Details   Patient was taken to the operating room and placed under general anesthesia without difficulty.  She was placed in the dorsal lithotomy position in yellowfin stirrups and prepped and draped in the normal sterile fashion.  Bladder was drained with a red Tian catheter.  Timeout was performed and patient and procedure were verified.  Exam under anesthesia revealed a small, mobile anteverted uterus and a mass was noted in the left lower quadrant.  A sponge stick was placed into the vagina.  Gloves were changed and attention was turned toward the abdominal portion.  An approximately 1 cm incision was made under the umbilicus.  This was carried down to the fascia and the fascia was grasped with Dewey clamps.  The fascia was incised with Kenny scissors and the abdominal cavity was entered using an open technique.  2 stay sutures of 0 Vicryl were placed through the fascia at the umbilical incision.  The 10 mm Mendoza trocar was placed through this incision.  Pneumoperitoneum was achieved to a level of 15 millimeters of mercury of carbon dioxide.  2 5 mm blunt-tipped trochars were placed laterally to the umbilical incision under direct visualization.  A large simple appearing ovarian cyst was noted arising from the left ovary.  It was approximately 9 to 10 cm in size and simple appearing.  The  laparoscopic needle was inserted under direct visualization and inserted into the ovarian cyst.  60 cc of straw-colored fluid was extracted and sent for cytology.  The suction  was then inserted in through this entry point into the ovarian cyst and the remaining fluid was suctioned.  Once the ovarian cyst wall had collapsed, the LigaSure [place] device was used to transect the ovarian cyst wall from the underlying fallopian tube and ovarian tissue.  Once the ovarian cyst wall was excised, it was removed from the abdomen in an Endo Catch bag and sent to pathology.  Visualization of the remaining pedicles were visualized and were hemostatic.  Patient's uterus was normal-appearing and right fallopian tube and ovary were normal-appearing.  Her pelvis and abdomen were then irrigated and suctioned.  The pneumoperitoneum was then released and the pedicles were inspected for hemostasis.  Excellent hemostasis was noted.  The lateral trochars were then removed under laparoscopic visualization and hemostasis was observed.  Pneumoperitoneum was released and the umbilical port was removed.  The fascia at the umbilicus was closed using 2 interrupted stitches of 0 Vicryl.  The skin at the umbilical incision was closed with 4-0 Vicryl in subcuticular fashion.  One interrupted stitch was placed at the skin of the lateral abdominal incisions using 4-0 Vicryl.  Each incision was covered with Dermabond.  20 mL of 0.5% plain Naropin was injected at the incision sites for anesthesia.  The sponge stick was removed from her vagina.  Counts for needles, sponges, laps and instruments were correct times two at the end of the procedure. I was present and scrubbed for the entire procedure. There were no major complications. The patient was transported to the recovery area in stable condition.    Findings:  9 to 10 cm simple appearing left ovarian cyst  Normal-appearing uterus  Normal right fallopian tube and normal appearing right  ovary    Estimated Blood Loss:  5 mL           Drains: None           Specimens:   ID Type Source Tests Collected by Time   A : ovarian cyst fluid Body Fluid Ovary, Left NON-GYNECOLOGIC CYTOLOGY Annabel Villarreal MD 4/18/2023 1547   B (Not marked as sent) : left ovarian cyst Tissue Ovary, Left TISSUE PATHOLOGY EXAM Annabel Villarreal MD 4/18/2023 1610              Implants: * No implants in log *           Complications: None           Disposition: PACU - hemodynamically stable.           Condition: stable

## 2023-04-18 NOTE — H&P
Patient Care Team:  Erika Barone APRN as PCP - General (Family Medicine)  Rocco Barrow MD as Consulting Physician (Obstetrics and Gynecology)    Chief complaint   Left ovarian cyst    Subjective     Patient is a 22 y.o. female with a 9 cm simple appearing left ovarian cyst.  It was followed over 2 months with no decrease in size.  She is not having any symptoms related to this cyst.  It was found incidentally at the time of her gyn annual.  She was counseled on management options including expectant management but wishes to proceed with surgical management.    Review of Systems   Pertinent items are noted in HPI, all other systems reviewed and negative    History  Past Medical History:   Diagnosis Date   • Generalized anxiety disorder    • Ocular migraine    • Ovarian cyst     LEFT   • Palpitation    • Urine incontinence      History reviewed. No pertinent surgical history.  Family History   Problem Relation Age of Onset   • Breast cancer Maternal Grandmother    • Hypertension Maternal Grandmother    • Diabetes Maternal Grandfather    • Breast cancer Paternal Grandmother    • Malig Hyperthermia Neg Hx      Social History     Tobacco Use   • Smoking status: Former     Packs/day: 0.25     Years: 2.00     Pack years: 0.50     Types: Cigarettes     Quit date:      Years since quittin.2   • Smokeless tobacco: Never   • Tobacco comments:     Vapes 1-2x/day   Vaping Use   • Vaping Use: Every day   • Substances: Nicotine, THC, Flavoring   Substance Use Topics   • Alcohol use: Yes     Comment: RARE   • Drug use: No     Medications Prior to Admission   Medication Sig Dispense Refill Last Dose   • buPROPion XL (WELLBUTRIN XL) 300 MG 24 hr tablet Take 1 tablet by mouth Every Morning.   2023 at 0800   • Levonorgestrel (Mirena, 52 MG,) 20 MCG/DAY intrauterine device IUD 1 each by Intrauterine route.   2023     Allergies:  Clarion flavor    Objective     Vital Signs  Heart Rate:  [89]  89  Resp:  [16] 16  BP: (107)/(78) 107/78    Physical Exam:    General Appearance: alert, appears stated age and cooperative  Lungs: clear to auscultation, respirations regular, respirations even and respirations unlabored  Heart: regular rhythm & normal rate  Abdomen: normal bowel sounds, no masses, no hepatomegaly, no splenomegaly, soft non-tender, no guarding and no rebound tenderness  Pelvic: Exam deferred  Extremities: moves extremities well, no edema, no cyanosis and no redness    Results Review:    I reviewed the patient's new clinical results.    Assessment & Plan       Left ovarian cyst      22 year old female with 9 cm simple appearing left ovarian cyst    She would like to proceed with surgical removal.  I discussed with her that we will plan laparoscopic left ovarian cystectomy and I did discuss with her the possibility for oophorectomy.  Discussed risks including risk of infection, bleeding, damage to surrounding structures, risk of anesthesia, blood clots, heart attack, and stroke.  Discussed postoperative recovery and restrictions.  She understands all risks and wishes to proceed with laparoscopic left ovarian cystectomy.    I discussed the patients findings and my recommendations with patient.     Annabel Villarreal MD  04/18/23  12:59 EDT

## 2023-04-18 NOTE — ANESTHESIA PREPROCEDURE EVALUATION
Anesthesia Evaluation                  Airway   Mallampati: I  TM distance: >3 FB  Neck ROM: full  No difficulty expected  Dental - normal exam     Pulmonary - normal exam   (+) a smoker Current,   Cardiovascular - normal exam        Neuro/Psych  (+) headaches, psychiatric history Anxiety,    GI/Hepatic/Renal/Endo      Musculoskeletal     Abdominal  - normal exam    Bowel sounds: normal.   Substance History      OB/GYN          Other                        Anesthesia Plan    ASA 2     general     intravenous induction     Anesthetic plan, risks, benefits, and alternatives have been provided, discussed and informed consent has been obtained with: patient.        CODE STATUS:

## 2023-04-18 NOTE — ANESTHESIA PROCEDURE NOTES
Airway  Urgency: elective    Date/Time: 4/18/2023 3:23 PM  Airway not difficult    General Information and Staff    Patient location during procedure: OR  Anesthesiologist: Hugo Jacinto MD    Indications and Patient Condition  Indications for airway management: airway protection    Preoxygenated: yes  Mask difficulty assessment: 1 - vent by mask    Final Airway Details  Final airway type: endotracheal airway      Successful airway: ETT  Cuffed: yes   Successful intubation technique: direct laryngoscopy  Endotracheal tube insertion site: oral  Blade: Toni  Blade size: 3  ETT size (mm): 7.0  Cormack-Lehane Classification: grade I - full view of glottis  Placement verified by: chest auscultation and capnometry   Cuff volume (mL): 5  Measured from: teeth  ETT/EBT  to teeth (cm): 22  Number of attempts at approach: 1  Assessment: lips, teeth, and gum same as pre-op and atraumatic intubation

## 2023-04-20 LAB
CYTO UR: NORMAL
LAB AP CASE REPORT: NORMAL
LAB AP CASE REPORT: NORMAL
PATH REPORT.FINAL DX SPEC: NORMAL
PATH REPORT.FINAL DX SPEC: NORMAL
PATH REPORT.GROSS SPEC: NORMAL
PATH REPORT.GROSS SPEC: NORMAL

## 2023-05-01 ENCOUNTER — TELEPHONE (OUTPATIENT)
Dept: OBSTETRICS AND GYNECOLOGY | Facility: CLINIC | Age: 23
End: 2023-05-01
Payer: COMMERCIAL

## 2023-05-01 NOTE — TELEPHONE ENCOUNTER
----- Message from Annabel Villarreal MD sent at 4/28/2023  8:48 AM EDT -----  Please let patient know that her pathology from her surgery returned benign and she does need to schedule follow-up appointment with me.

## 2024-01-10 ENCOUNTER — OFFICE VISIT (OUTPATIENT)
Dept: OBSTETRICS AND GYNECOLOGY | Facility: CLINIC | Age: 24
End: 2024-01-10
Payer: COMMERCIAL

## 2024-01-10 VITALS
DIASTOLIC BLOOD PRESSURE: 73 MMHG | HEART RATE: 76 BPM | SYSTOLIC BLOOD PRESSURE: 111 MMHG | HEIGHT: 62 IN | WEIGHT: 153 LBS | BODY MASS INDEX: 28.16 KG/M2

## 2024-01-10 DIAGNOSIS — Z97.5 IUD (INTRAUTERINE DEVICE) IN PLACE: ICD-10-CM

## 2024-01-10 DIAGNOSIS — Z01.419 ENCOUNTER FOR GYNECOLOGICAL EXAMINATION: Primary | ICD-10-CM

## 2024-01-10 RX ORDER — BUPROPION HYDROCHLORIDE 150 MG/1
150 TABLET ORAL EVERY MORNING
COMMUNITY
Start: 2023-12-12

## 2024-01-10 NOTE — PROGRESS NOTES
"Chief Complaint  Annual Exam- Pap- 2023    Subjective        Christine Berkowitz presents to [facility]  History of Present Illness  Patient is a 23-year-old that presents for gynecological exam.  She underwent a laparoscopic left ovarian cystectomy last year and pathology was benign.  She has no concerns today.  She uses the Mirena IUD for contraception and does report that she has irregular bleeding, but states it is not bothersome.  She is sexually active with 1 partner.  She declines STD screening.  She does report a history of breast cancer in her paternal and maternal grandmothers.    Objective   Vital Signs:  /73   Pulse 76   Ht 157.5 cm (62\")   Wt 69.4 kg (153 lb)   BMI 27.98 kg/m²   Estimated body mass index is 27.98 kg/m² as calculated from the following:    Height as of this encounter: 157.5 cm (62\").    Weight as of this encounter: 69.4 kg (153 lb).             Physical Exam  Vitals reviewed. Exam conducted with a chaperone present.   Constitutional:       Appearance: She is well-developed.   Cardiovascular:      Rate and Rhythm: Normal rate and regular rhythm.   Pulmonary:      Effort: Pulmonary effort is normal.      Breath sounds: Normal breath sounds.   Chest:   Breasts:     Right: No inverted nipple, mass, nipple discharge, skin change or tenderness.      Left: No inverted nipple, mass, nipple discharge, skin change or tenderness.   Abdominal:      General: There is no distension.      Palpations: Abdomen is soft.      Tenderness: There is no abdominal tenderness.   Genitourinary:     Labia:         Right: No rash, tenderness, lesion or injury.         Left: No rash, tenderness, lesion or injury.       Vagina: Normal.      Cervix: Normal.      Uterus: Normal.       Adnexa:         Right: No mass, tenderness or fullness.          Left: No mass, tenderness or fullness.        Comments: IUD strings visualized  Neurological:      Mental Status: She is alert.        Result " Review :                   Assessment and Plan   Diagnoses and all orders for this visit:    1. Encounter for gynecological examination (Primary)    2. IUD (intrauterine device) in place      Patient was counseled that her IUD will  in 2 years.  I did discuss with her that she can have it replaced sooner if irregular bleeding becomes bothersome.  She was counseled on monthly self breast exams for breast health.  She may follow-up in 1 year or sooner if needed.       Follow Up   Return in about 1 year (around 1/10/2025) for gynecological exam.  Patient was given instructions and counseling regarding her condition or for health maintenance advice. Please see specific information pulled into the AVS if appropriate.

## 2024-04-24 NOTE — TELEPHONE ENCOUNTER
Called patient to discuss ultrasound results and no answer.  Voicemail was left to return phone call.   [As Noted in HPI] : as noted in HPI [Negative] : Heme/Lymph

## (undated) DEVICE — SOL NACL 0.9PCT 1000ML

## (undated) DEVICE — GLV SURG SENSICARE POLYISPRN W/ALOE PF LF 6.5 GRN STRL

## (undated) DEVICE — LAPAROSCOPIC SMOKE FILTRATION SYSTEM: Brand: PALL LAPAROSHIELD® PLUS LAPAROSCOPIC SMOKE FILTRATION SYSTEM

## (undated) DEVICE — APPL CHLORAPREP HI/LITE 26ML ORNG

## (undated) DEVICE — GLV SURG SENSICARE PI MIC PF SZ6 LF STRL

## (undated) DEVICE — 2, DISPOSABLE SUCTION/IRRIGATOR WITH DISPOSABLE TIP: Brand: STRYKEFLOW

## (undated) DEVICE — ENDOPATH XCEL DILATING TIP TROCARS WITH STABILITY SLEEVES: Brand: ENDOPATH XCEL

## (undated) DEVICE — ADHS SKIN SURG TISS VISC PREMIERPRO EXOFIN HI/VISC FAST/DRY

## (undated) DEVICE — ENDOPATH XCEL BLUNT TIP TROCARS WITH SMOOTH SLEEVES: Brand: ENDOPATH XCEL

## (undated) DEVICE — LOU GYN LAPAROSCOPY: Brand: MEDLINE INDUSTRIES, INC.

## (undated) DEVICE — ENDOPOUCH RETRIEVER SPECIMEN RETRIEVAL BAGS: Brand: ENDOPOUCH RETRIEVER

## (undated) DEVICE — MARYLAND JAW LAPAROSCOPIC SEALER/DIVIDER COATED: Brand: LIGASURE

## (undated) DEVICE — SUT VIC 0/0 UR6 27IN DYED J603H

## (undated) DEVICE — ANTIBACTERIAL UNDYED BRAIDED (POLYGLACTIN 910), SYNTHETIC ABSORBABLE SUTURE: Brand: COATED VICRYL